# Patient Record
Sex: MALE | Race: WHITE | NOT HISPANIC OR LATINO | Employment: OTHER | URBAN - METROPOLITAN AREA
[De-identification: names, ages, dates, MRNs, and addresses within clinical notes are randomized per-mention and may not be internally consistent; named-entity substitution may affect disease eponyms.]

---

## 2019-05-29 ENCOUNTER — TELEPHONE (OUTPATIENT)
Dept: SCHEDULING | Facility: IMAGING CENTER | Age: 59
End: 2019-05-29

## 2019-08-27 ENCOUNTER — OFFICE VISIT (OUTPATIENT)
Dept: MEDICAL GROUP | Facility: MEDICAL CENTER | Age: 59
End: 2019-08-27
Payer: COMMERCIAL

## 2019-08-27 VITALS
RESPIRATION RATE: 17 BRPM | HEIGHT: 75 IN | TEMPERATURE: 98.3 F | WEIGHT: 200.2 LBS | DIASTOLIC BLOOD PRESSURE: 70 MMHG | BODY MASS INDEX: 24.89 KG/M2 | OXYGEN SATURATION: 97 % | SYSTOLIC BLOOD PRESSURE: 114 MMHG | HEART RATE: 55 BPM

## 2019-08-27 DIAGNOSIS — E03.8 OTHER SPECIFIED HYPOTHYROIDISM: ICD-10-CM

## 2019-08-27 DIAGNOSIS — Z86.19: ICD-10-CM

## 2019-08-27 DIAGNOSIS — Z86.73 HISTORY OF STROKE: ICD-10-CM

## 2019-08-27 DIAGNOSIS — I10 ESSENTIAL HYPERTENSION: ICD-10-CM

## 2019-08-27 DIAGNOSIS — B18.8: ICD-10-CM

## 2019-08-27 DIAGNOSIS — Z23 NEED FOR VACCINATION: ICD-10-CM

## 2019-08-27 DIAGNOSIS — Z00.00 PREVENTATIVE HEALTH CARE: ICD-10-CM

## 2019-08-27 DIAGNOSIS — Z12.5 ENCOUNTER FOR SCREENING FOR MALIGNANT NEOPLASM OF PROSTATE: ICD-10-CM

## 2019-08-27 DIAGNOSIS — Z87.891 HISTORY OF TOBACCO USE: ICD-10-CM

## 2019-08-27 DIAGNOSIS — Z11.59 ENCOUNTER FOR HEPATITIS C SCREENING TEST FOR LOW RISK PATIENT: ICD-10-CM

## 2019-08-27 PROCEDURE — 90471 IMMUNIZATION ADMIN: CPT | Performed by: INTERNAL MEDICINE

## 2019-08-27 PROCEDURE — 99204 OFFICE O/P NEW MOD 45 MIN: CPT | Mod: 25 | Performed by: INTERNAL MEDICINE

## 2019-08-27 PROCEDURE — 90715 TDAP VACCINE 7 YRS/> IM: CPT | Performed by: INTERNAL MEDICINE

## 2019-08-27 RX ORDER — LOSARTAN POTASSIUM 50 MG/1
50 TABLET ORAL DAILY
COMMUNITY
End: 2019-08-27 | Stop reason: SDUPTHER

## 2019-08-27 RX ORDER — LOSARTAN POTASSIUM 50 MG/1
50 TABLET ORAL DAILY
Qty: 90 TAB | Refills: 0 | Status: SHIPPED | OUTPATIENT
Start: 2019-08-27 | End: 2019-12-02 | Stop reason: SDUPTHER

## 2019-08-27 RX ORDER — LEVOTHYROXINE SODIUM 0.03 MG/1
25 TABLET ORAL
COMMUNITY
Start: 2019-06-03 | End: 2019-08-27 | Stop reason: SDUPTHER

## 2019-08-27 RX ORDER — LEVOTHYROXINE SODIUM 0.03 MG/1
25 TABLET ORAL
Qty: 90 TAB | Refills: 0 | Status: SHIPPED | OUTPATIENT
Start: 2019-08-27 | End: 2019-12-02 | Stop reason: SDUPTHER

## 2019-08-27 SDOH — HEALTH STABILITY: MENTAL HEALTH: HOW OFTEN DO YOU HAVE A DRINK CONTAINING ALCOHOL?: 2-3 TIMES A WEEK

## 2019-08-27 SDOH — HEALTH STABILITY: MENTAL HEALTH: HOW MANY STANDARD DRINKS CONTAINING ALCOHOL DO YOU HAVE ON A TYPICAL DAY?: 1 OR 2

## 2019-08-27 NOTE — ASSESSMENT & PLAN NOTE
Developed hepatitis E infection while vacationing in [facility] approximately 13 years ago.  He has had no residual complications.  Did receive the hepatitis A/B vaccine more than a decade ago.

## 2019-08-27 NOTE — ASSESSMENT & PLAN NOTE
History of hyperthyroidism in 2003, he did not require ablation or surgery. He was managed with medications until his thyroid burned out and now he is on levothyroxine.  Wife says he feels little more fatigued, they wish to recheck his blood work.  Otherwise no focal symptoms of hypothyroid.

## 2019-08-27 NOTE — ASSESSMENT & PLAN NOTE
Hx remote stroke, symptom of dizziness/disorientated, decreased motor of lower face and numbness in fingers.  Had brain imaging that showed stroke (no records available for distribution of cerebral infarct). No residual symptoms. W/u showed ?? small PFO, repair not thought to be needed.  Started on ASA 81 mg and blood pressure control. Neurologist did not recommend statin.  No recurrence.

## 2019-08-27 NOTE — ASSESSMENT & PLAN NOTE
Blood pressure is well controlled on losartan 50 mg daily.  He denies any cardiopulmonary symptoms.  Exercises regularly without any cardiac concerns.

## 2019-08-27 NOTE — ASSESSMENT & PLAN NOTE
Remote tobacco use, about a pack per month.  His father was a urologist and  of bladder cancer with no smoking history.  Patient is interested in urinalysis for screening.  No pulmonary symptoms.

## 2019-08-27 NOTE — PROGRESS NOTES
CC:  Diagnoses of Chronic hepatitis E virus infection (HCC), History of stroke, Other specified hypothyroidism, Essential hypertension, Encounter for hepatitis C screening test for low risk patient, Encounter for screening for malignant neoplasm of prostate, Preventative health care, History of tobacco use, Need for vaccination, and History of hepatitis E virus infection were pertinent to this visit.    HISTORY OF THE PRESENT ILLNESS: Patient is a 59 y.o. male. This pleasant patient is here today to establish care.  Presents with his wife Alma.      Other specified hypothyroidism  History of hyperthyroidism in , he did not require ablation or surgery. He was managed with medications until his thyroid burned out and now he is on levothyroxine.  Wife says he feels little more fatigued, they wish to recheck his blood work.  Otherwise no focal symptoms of hypothyroid.    History of tobacco use  Remote tobacco use, about a pack per month.  His father was a urologist and  of bladder cancer with no smoking history.  Patient is interested in urinalysis for screening.  No pulmonary symptoms.    History of stroke  Hx remote stroke, symptom of dizziness/disorientated, decreased motor of lower face and numbness in fingers.  Had brain imaging that showed stroke (no records available for distribution of cerebral infarct). No residual symptoms. W/u showed ?? small PFO, repair not thought to be needed.  Started on ASA 81 mg and blood pressure control. Neurologist did not recommend statin.  No recurrence.     Essential hypertension  Blood pressure is well controlled on losartan 50 mg daily.  He denies any cardiopulmonary symptoms.  Exercises regularly without any cardiac concerns.    History of hepatitis E virus infection  Developed hepatitis E infection while vacationing in [place] approximately 13 years ago.  He has had no residual complications.  Did receive the hepatitis A/B vaccine more than a decade ago.    Preventative  health care  Colonoscopy was performed around age 51 and he says he is due again in 10 years at age 61.    Allergies: Patient has no allergy information on record.    Current Outpatient Medications Ordered in Epic   Medication Sig Dispense Refill   • aspirin 81 MG tablet Take 81 mg by mouth every day.     • levothyroxine (SYNTHROID) 25 MCG Tab Take 1 Tab by mouth Every morning on an empty stomach. 90 Tab 0   • losartan (COZAAR) 50 MG Tab Take 1 Tab by mouth every day. 90 Tab 0     No current Epic-ordered facility-administered medications on file.        Past Medical History:   Diagnosis Date   • Hypertension    • Stroke (HCC)    • Thyroid disease        Past Surgical History:   Procedure Laterality Date   • OTHER      r rotator cuff repair x 2, one rotator repair on L; Left knee meniscus repair; plate & 7 screws L ankle   • TONSILLECTOMY         Social History     Tobacco Use   • Smoking status: Former Smoker     Years: 13.00     Types: Cigarettes     Last attempt to quit: 1996     Years since quittin.0   • Smokeless tobacco: Never Used   • Tobacco comment: Light social smoker; 1 pack/mo   Substance Use Topics   • Alcohol use: Yes     Alcohol/week: 1.2 - 1.8 oz     Types: 2 - 3 Cans of beer per week     Frequency: 2-3 times a week     Drinks per session: 1 or 2     Comment: Depends on the day for type    • Drug use: Not Currently       Social History     Social History Narrative   • Not on file       Family History   Problem Relation Age of Onset   • No Known Problems Mother    • Cancer Father         bladder cancer; no tob   • Other Brother         memory/neuro issues ? alzheimers/ PD   • Other Brother         memory/neuro issues ? alzheimers/ PD   • Other Brother         memory/neuro issues ? alzheimers/ PD   • Other Brother         memory/neuro issues ? alzheimers/ PD   • No Known Problems Maternal Grandmother    • No Known Problems Maternal Grandfather    • Other Paternal Grandmother         dementia  "  • Heart Disease Paternal Grandfather        ROS:     - Constitutional: Negative for fever, chills, unexpected weight change, night sweats    - Eyes:   Negative for blurry vision, eye pain, discharge    - ENT:  Negative for hearing changes, ear pain, ear discharge, rhinorrhea, sinus congestion, sore throat     - Respiratory: Negative for cough, sputum production, chest congestion, dyspnea, wheezing, and crackles.      - Cardiovascular: Negative for chest pain, palpitations, orthopnea, and bilateral lower extremity edema.     - Gastrointestinal: Negative for heartburn, nausea, vomiting, abdominal pain, hematochezia, melena, diarrhea, constipation, and greasy/foul-smelling stools.     - Genitourinary: Negative for dysuria, polyuria, hematuria, pyuria, urinary urgency, and urinary incontinence.     - Musculoskeletal: Negative for myalgias, back pain, and joint pain.     - Skin: Negative for rash, itching, cyanotic skin color change.     - Neurological: Negative for migraines, numbness, ataxia, tremors, vertigo    - Endo:Negative for polyuria, heat/cold intolerance, excessive thirst    - Hem/lymphatic: Negative for easy bruising, blood clots, lymphedema, swollen glands    -Allergic/immun: Negative for allergic rhinitis    - Psychiatric/Behavioral: Negative for depression, suicidal/homicidal ideation and memory loss.      Exam: /70 (BP Location: Right arm, Patient Position: Sitting, BP Cuff Size: Adult)   Pulse (!) 55   Temp 36.8 °C (98.3 °F) (Temporal)   Resp 17   Ht 1.895 m (6' 2.61\")   Wt 90.8 kg (200 lb 3.2 oz)   SpO2 97%  Body mass index is 25.29 kg/m².    General: Normal appearing. No distress.  EYES: Conjunctiva clear lids without ptosis, pupils equal  EARS: Normal shape and contour   NOSE, THROAT: nasal mucosa benign. oropharynx is without erythema, edema or exudates.   Neck: Supple without LAD. Thyroid is not enlarged.  Pulmonary: Clear to ausculation.  Normal effort. No rales or " wheezing.  Cardiovascular: Regular rate and rhythm without significant murmur.   Abdomen: Soft, nontender, nondistended. Normal bowel sounds.  Neurologic: Cranial nerves grossly nonfocal  Lymph: No cervical, supraclavicular nodes palpable  Skin: Warm and dry.  No obvious lesions.  Musculoskeletal: Normal gait. No extremity cyanosis, clubbing, or edema.  Psych: Normal mood and affect. Alert and oriented x3. Judgment and insight is normal.         Assessment/Plan  1.  History of remote hepatitis E infection without residual complications  Historical infection, no treatment needed.  - Comp Metabolic Panel; Future    2. History of stroke  Remote history of stroke, without recurrence.  Stable.  Blood pressure pressure controlled.  Continue aspirin 81 mg daily.  - Lipid Profile; Future    3. Other specified hypothyroidism  Chronic condition, patient presumes stable, will check labs.  - Lipid Profile; Future  - TSH; Future  - FREE THYROXINE; Future  - levothyroxine (SYNTHROID) 25 MCG Tab; Take 1 Tab by mouth Every morning on an empty stomach.  Dispense: 90 Tab; Refill: 0    4. Essential hypertension  Chronic, stable controlled medical condition.  - CBC WITH DIFFERENTIAL; Future  - Comp Metabolic Panel; Future  - Lipid Profile; Future  - losartan (COZAAR) 50 MG Tab; Take 1 Tab by mouth every day.  Dispense: 90 Tab; Refill: 0    5. Encounter for hepatitis C screening test for low risk patient  Preventive health guidelines to screen given birth cohort.    6. Encounter for screening for malignant neoplasm of prostate  Asymptomatic  -PSA; Future    7. Preventative health care  - HEP C VIRUS ANTIBODY; Future  - CBC WITH DIFFERENTIAL; Future  - Comp Metabolic Panel; Future  - Lipid Profile; Future  - TSH; Future  - VITAMIN D,25 HYDROXY; Future  - PROSTATE SPECIFIC AG SCREENING; Future  - URINALYSIS; Future    8. History of tobacco use  Remotely quit tobacco.  - URINALYSIS; Future    9. Need for vaccination  Last Tdap more than 10  years ago  - Tdap Vaccine =>6YO IM      Return to clinic 3 months        Please note that this dictation was created using voice recognition software. I have made every reasonable attempt to correct obvious errors, but I expect that there are errors of grammar and possibly content that I did not discover before finalizing the note.

## 2019-09-10 ENCOUNTER — HOSPITAL ENCOUNTER (OUTPATIENT)
Dept: LAB | Facility: MEDICAL CENTER | Age: 59
End: 2019-09-10
Attending: INTERNAL MEDICINE
Payer: COMMERCIAL

## 2019-09-10 DIAGNOSIS — I10 ESSENTIAL HYPERTENSION: ICD-10-CM

## 2019-09-10 DIAGNOSIS — Z86.73 HISTORY OF STROKE: ICD-10-CM

## 2019-09-10 DIAGNOSIS — E03.8 OTHER SPECIFIED HYPOTHYROIDISM: ICD-10-CM

## 2019-09-10 DIAGNOSIS — Z87.891 HISTORY OF TOBACCO USE: ICD-10-CM

## 2019-09-10 DIAGNOSIS — Z12.5 ENCOUNTER FOR SCREENING FOR MALIGNANT NEOPLASM OF PROSTATE: ICD-10-CM

## 2019-09-10 DIAGNOSIS — Z00.00 PREVENTATIVE HEALTH CARE: ICD-10-CM

## 2019-09-10 DIAGNOSIS — B18.8: ICD-10-CM

## 2019-09-10 LAB
25(OH)D3 SERPL-MCNC: 29 NG/ML (ref 30–100)
ALBUMIN SERPL BCP-MCNC: 4.1 G/DL (ref 3.2–4.9)
ALBUMIN/GLOB SERPL: 1.4 G/DL
ALP SERPL-CCNC: 57 U/L (ref 30–99)
ALT SERPL-CCNC: 20 U/L (ref 2–50)
ANION GAP SERPL CALC-SCNC: 9 MMOL/L (ref 0–11.9)
APPEARANCE UR: CLEAR
AST SERPL-CCNC: 29 U/L (ref 12–45)
BACTERIA #/AREA URNS HPF: NEGATIVE /HPF
BASOPHILS # BLD AUTO: 0.8 % (ref 0–1.8)
BASOPHILS # BLD: 0.04 K/UL (ref 0–0.12)
BILIRUB SERPL-MCNC: 0.8 MG/DL (ref 0.1–1.5)
BILIRUB UR QL STRIP.AUTO: NEGATIVE
BUN SERPL-MCNC: 24 MG/DL (ref 8–22)
CALCIUM SERPL-MCNC: 9.3 MG/DL (ref 8.5–10.5)
CHLORIDE SERPL-SCNC: 108 MMOL/L (ref 96–112)
CHOLEST SERPL-MCNC: 173 MG/DL (ref 100–199)
CO2 SERPL-SCNC: 19 MMOL/L (ref 20–33)
COLOR UR: YELLOW
CREAT SERPL-MCNC: 1.23 MG/DL (ref 0.5–1.4)
EOSINOPHIL # BLD AUTO: 0.05 K/UL (ref 0–0.51)
EOSINOPHIL NFR BLD: 1 % (ref 0–6.9)
EPI CELLS #/AREA URNS HPF: NEGATIVE /HPF
ERYTHROCYTE [DISTWIDTH] IN BLOOD BY AUTOMATED COUNT: 45.4 FL (ref 35.9–50)
FASTING STATUS PATIENT QL REPORTED: NORMAL
GLOBULIN SER CALC-MCNC: 2.9 G/DL (ref 1.9–3.5)
GLUCOSE SERPL-MCNC: 87 MG/DL (ref 65–99)
GLUCOSE UR STRIP.AUTO-MCNC: NEGATIVE MG/DL
HCT VFR BLD AUTO: 46.8 % (ref 42–52)
HCV AB SER QL: NEGATIVE
HDLC SERPL-MCNC: 61 MG/DL
HGB BLD-MCNC: 15.1 G/DL (ref 14–18)
HYALINE CASTS #/AREA URNS LPF: ABNORMAL /LPF
IMM GRANULOCYTES # BLD AUTO: 0.02 K/UL (ref 0–0.11)
IMM GRANULOCYTES NFR BLD AUTO: 0.4 % (ref 0–0.9)
KETONES UR STRIP.AUTO-MCNC: NEGATIVE MG/DL
LDLC SERPL CALC-MCNC: 103 MG/DL
LEUKOCYTE ESTERASE UR QL STRIP.AUTO: NEGATIVE
LYMPHOCYTES # BLD AUTO: 1.55 K/UL (ref 1–4.8)
LYMPHOCYTES NFR BLD: 30.4 % (ref 22–41)
MCH RBC QN AUTO: 30.9 PG (ref 27–33)
MCHC RBC AUTO-ENTMCNC: 32.3 G/DL (ref 33.7–35.3)
MCV RBC AUTO: 95.7 FL (ref 81.4–97.8)
MICRO URNS: ABNORMAL
MONOCYTES # BLD AUTO: 0.42 K/UL (ref 0–0.85)
MONOCYTES NFR BLD AUTO: 8.2 % (ref 0–13.4)
NEUTROPHILS # BLD AUTO: 3.02 K/UL (ref 1.82–7.42)
NEUTROPHILS NFR BLD: 59.2 % (ref 44–72)
NITRITE UR QL STRIP.AUTO: NEGATIVE
NRBC # BLD AUTO: 0 K/UL
NRBC BLD-RTO: 0 /100 WBC
PH UR STRIP.AUTO: 6 [PH] (ref 5–8)
PLATELET # BLD AUTO: 256 K/UL (ref 164–446)
PMV BLD AUTO: 9.9 FL (ref 9–12.9)
POTASSIUM SERPL-SCNC: 4.5 MMOL/L (ref 3.6–5.5)
PROT SERPL-MCNC: 7 G/DL (ref 6–8.2)
PROT UR QL STRIP: NEGATIVE MG/DL
PSA SERPL-MCNC: 0.92 NG/ML (ref 0–4)
RBC # BLD AUTO: 4.89 M/UL (ref 4.7–6.1)
RBC # URNS HPF: ABNORMAL /HPF
RBC UR QL AUTO: ABNORMAL
SODIUM SERPL-SCNC: 136 MMOL/L (ref 135–145)
SP GR UR STRIP.AUTO: 1.02
T4 FREE SERPL-MCNC: 1.06 NG/DL (ref 0.53–1.43)
TRIGL SERPL-MCNC: 43 MG/DL (ref 0–149)
TSH SERPL DL<=0.005 MIU/L-ACNC: 3.3 UIU/ML (ref 0.38–5.33)
UROBILINOGEN UR STRIP.AUTO-MCNC: 0.2 MG/DL
WBC # BLD AUTO: 5.1 K/UL (ref 4.8–10.8)
WBC #/AREA URNS HPF: ABNORMAL /HPF

## 2019-09-10 PROCEDURE — 82306 VITAMIN D 25 HYDROXY: CPT

## 2019-09-10 PROCEDURE — 85025 COMPLETE CBC W/AUTO DIFF WBC: CPT

## 2019-09-10 PROCEDURE — 84153 ASSAY OF PSA TOTAL: CPT

## 2019-09-10 PROCEDURE — 80053 COMPREHEN METABOLIC PANEL: CPT

## 2019-09-10 PROCEDURE — 84443 ASSAY THYROID STIM HORMONE: CPT

## 2019-09-10 PROCEDURE — 86803 HEPATITIS C AB TEST: CPT

## 2019-09-10 PROCEDURE — 84439 ASSAY OF FREE THYROXINE: CPT

## 2019-09-10 PROCEDURE — 36415 COLL VENOUS BLD VENIPUNCTURE: CPT

## 2019-09-10 PROCEDURE — 80061 LIPID PANEL: CPT

## 2019-09-10 PROCEDURE — 81001 URINALYSIS AUTO W/SCOPE: CPT

## 2019-09-12 ENCOUNTER — TELEPHONE (OUTPATIENT)
Dept: MEDICAL GROUP | Facility: MEDICAL CENTER | Age: 59
End: 2019-09-12

## 2019-09-13 ENCOUNTER — PATIENT MESSAGE (OUTPATIENT)
Dept: MEDICAL GROUP | Facility: MEDICAL CENTER | Age: 59
End: 2019-09-13

## 2019-09-13 NOTE — TELEPHONE ENCOUNTER
----- Message from Alison Granger M.D. sent at 9/11/2019  8:28 AM PDT -----  Please kindly instruct patient that we will need to discuss his labs at his next normally scheduled clinic appointment.  There was some microscopic blood in the urine that we will talk about.  Hepatitis C virus negative.  Vitamin D little low he should take cholecalciferol 1000 to 2000 units daily to replace.  Thyroid normal.  Prostate cancer screening test normal.  Lipids are reasonable.  Electrolytes are reasonable, likely the small changes just represent prolonged fasting.  Liver normal.  Blood counts are normal.

## 2019-09-13 NOTE — TELEPHONE ENCOUNTER
1. Caller Name: Thomas Huber  Call Back Number: [phone] (home) [phone] (work)        Patient approves a detailed voicemail message: N\A    Called and spoke to patient no further questions at this time. He is going to try the vitamin OTC and drink lots of milk. Patient also stated it is okay to wait until December to discuss the blood in urine unless you think otherwise. Please advise.

## 2019-11-26 ENCOUNTER — TELEPHONE (OUTPATIENT)
Dept: MEDICAL GROUP | Facility: MEDICAL CENTER | Age: 59
End: 2019-11-26

## 2019-11-27 NOTE — TELEPHONE ENCOUNTER
Was the patient seen in the last year in this department? Yes    Does patient have an active prescription for medications requested? No     Received Request Via: Pharmacy     Cialis 10 88ct?    Not showing Rx on file though...    [facility]  Med [facility]

## 2019-12-02 DIAGNOSIS — I10 ESSENTIAL HYPERTENSION: ICD-10-CM

## 2019-12-02 DIAGNOSIS — E03.8 OTHER SPECIFIED HYPOTHYROIDISM: ICD-10-CM

## 2019-12-02 RX ORDER — LOSARTAN POTASSIUM 50 MG/1
50 TABLET ORAL DAILY
Qty: 90 TAB | Refills: 0 | Status: SHIPPED
Start: 2019-12-02 | End: 2020-01-30 | Stop reason: SDUPTHER

## 2019-12-02 RX ORDER — LEVOTHYROXINE SODIUM 0.03 MG/1
25 TABLET ORAL
Qty: 90 TAB | Refills: 0 | Status: SHIPPED
Start: 2019-12-02 | End: 2020-01-30 | Stop reason: SDUPTHER

## 2019-12-02 NOTE — TELEPHONE ENCOUNTER
Was the patient seen in the last year in this department? Yes    Does patient have an active prescription for medications requested? No     Received Request Via: Patient     Pt is [place], needs refill over at [facility] provided.

## 2020-01-30 ENCOUNTER — OFFICE VISIT (OUTPATIENT)
Dept: MEDICAL GROUP | Facility: MEDICAL CENTER | Age: 60
End: 2020-01-30
Payer: COMMERCIAL

## 2020-01-30 VITALS
OXYGEN SATURATION: 99 % | BODY MASS INDEX: 25.99 KG/M2 | HEIGHT: 75 IN | TEMPERATURE: 98.2 F | HEART RATE: 63 BPM | SYSTOLIC BLOOD PRESSURE: 122 MMHG | DIASTOLIC BLOOD PRESSURE: 80 MMHG | WEIGHT: 209 LBS

## 2020-01-30 DIAGNOSIS — Z12.5 ENCOUNTER FOR SCREENING FOR MALIGNANT NEOPLASM OF PROSTATE: ICD-10-CM

## 2020-01-30 DIAGNOSIS — Z00.00 PREVENTATIVE HEALTH CARE: ICD-10-CM

## 2020-01-30 DIAGNOSIS — I10 ESSENTIAL HYPERTENSION: ICD-10-CM

## 2020-01-30 DIAGNOSIS — Z23 NEED FOR VACCINATION: ICD-10-CM

## 2020-01-30 DIAGNOSIS — R31.21 ASYMPTOMATIC MICROSCOPIC HEMATURIA: ICD-10-CM

## 2020-01-30 DIAGNOSIS — E78.00 ELEVATED LDL CHOLESTEROL LEVEL: ICD-10-CM

## 2020-01-30 DIAGNOSIS — E03.8 OTHER SPECIFIED HYPOTHYROIDISM: ICD-10-CM

## 2020-01-30 PROCEDURE — 90686 IIV4 VACC NO PRSV 0.5 ML IM: CPT | Performed by: INTERNAL MEDICINE

## 2020-01-30 PROCEDURE — 90471 IMMUNIZATION ADMIN: CPT | Performed by: INTERNAL MEDICINE

## 2020-01-30 PROCEDURE — 99396 PREV VISIT EST AGE 40-64: CPT | Mod: 25 | Performed by: INTERNAL MEDICINE

## 2020-01-30 RX ORDER — LOSARTAN POTASSIUM 50 MG/1
50 TABLET ORAL DAILY
Qty: 90 TAB | Refills: 0 | Status: SHIPPED | OUTPATIENT
Start: 2020-01-30 | End: 2020-04-21

## 2020-01-30 RX ORDER — TADALAFIL 10 MG/1
10 TABLET ORAL PRN
Qty: 10 TAB | Refills: 11 | Status: SHIPPED
Start: 2020-01-30 | End: 2021-07-06 | Stop reason: SDUPTHER

## 2020-01-30 RX ORDER — LEVOTHYROXINE SODIUM 0.03 MG/1
25 TABLET ORAL
Qty: 90 TAB | Refills: 0 | Status: SHIPPED | OUTPATIENT
Start: 2020-01-30 | End: 2020-04-21

## 2020-01-30 ASSESSMENT — PATIENT HEALTH QUESTIONNAIRE - PHQ9: CLINICAL INTERPRETATION OF PHQ2 SCORE: 0

## 2020-01-30 NOTE — PROGRESS NOTES
CC:  Diagnoses of Asymptomatic microscopic hematuria, Need for vaccination, Essential hypertension, Other specified hypothyroidism, Elevated LDL cholesterol level, Preventative health care, and Encounter for screening for malignant neoplasm of prostate were pertinent to this visit.    HISTORY OF THE PRESENT ILLNESS: Patient is a 59 y.o. male. This pleasant patient is here today to f/u. Here w/wife.    Messi feels well no new issues.    He does need refill of his losartan, blood pressure well controlled, electrolytes normal, no cardiopulmonary symptoms.    Refill of levothyroxine, he feels euthyroid, labs have been reasonable.    Labs from 9/10/2019 reviewed with patient since her last appointment, TSH 3.300, T4 1.06.  PSA normal.  Urinalysis did show some microscopic hematuria which is very minimal 0-2 red blood cells per high-power field, he does have a history of tobacco dependence and travel in [place].  He is agreeable to see urology to determine if he needs further evaluation of this.  Otherwise vitamin D was a level 29 and he has since started cholecalciferol.  CMP and CBC reasonable, and lipid panel most in acute for LDL of 103.  He will work on dietary changes and regular exercise.    Also says he requires refill of tadalafil that he has taken for many years.  Takes it for erectile dysfunction and also he indicates for nocturia.  He is advised that nocturia treatment would be to limit caffeine, alcohol and any fluid intake 2 hours for bed.  He will also discuss this further with his urologist.  He knows not to mix tadalafil within 4 hours of his blood pressure medication, and that the medication can be fatal combination with cardiac drugs such as nitrates and he expressed understanding.  He has no chest pain at rest or exertion or suggestion of cardiopulmonary disease.    Allergies: Patient has no known allergies.    Current Outpatient Medications Ordered in Epic   Medication Sig Dispense Refill   •  losartan (COZAAR) 50 MG Tab Take 1 Tab by mouth every day. 90 Tab 0   • levothyroxine (SYNTHROID) 25 MCG Tab Take 1 Tab by mouth Every morning on an empty stomach. 90 Tab 0   • tadalafil (CIALIS) 10 MG tablet Take 1 Tab by mouth as needed for Erectile Dysfunction. 10 Tab 11   • aspirin 81 MG tablet Take 81 mg by mouth every day.       No current [facility]-ordered facility-administered medications on file.        Past Medical History:   Diagnosis Date   • Hypertension    • Stroke (HCC)    • Thyroid disease        Past Surgical History:   Procedure Laterality Date   • OTHER      r rotator cuff repair x 2, one rotator repair on L; Left knee meniscus repair; plate & 7 screws L ankle   • TONSILLECTOMY         Social History     Tobacco Use   • Smoking status: Former Smoker     Years: 13.00     Types: Cigarettes     Last attempt to quit: 1996     Years since quittin.4   • Smokeless tobacco: Never Used   • Tobacco comment: Light social smoker; 1 pack/mo   Substance Use Topics   • Alcohol use: Yes     Alcohol/week: 1.2 - 1.8 oz     Types: 2 - 3 Cans of beer per week     Frequency: 2-3 times a week     Drinks per session: 1 or 2     Comment: Depends on the day for type    • Drug use: Not Currently       Social History     Patient does not qualify to have social determinant information on file (likely too young).   Social History Narrative   • Not on file       Family History   Problem Relation Age of Onset   • No Known Problems Mother    • Cancer Father         bladder cancer; no tob   • Other Brother         memory/neuro issues ? alzheimers/ PD   • Other Brother         memory/neuro issues ? alzheimers/ PD   • Other Brother         memory/neuro issues ? alzheimers/ PD   • Other Brother         memory/neuro issues ? alzheimers/ PD   • No Known Problems Maternal Grandmother    • No Known Problems Maternal Grandfather    • Other Paternal Grandmother         dementia   • Heart Disease Paternal Grandfather        ROS:     -  "Constitutional: Negative for fever, chills, unexpected weight change, night sweats    - Eyes:   Negative for blurry vision, eye pain, discharge    - ENT:  Negative for hearing changes, ear pain, ear discharge, rhinorrhea, sinus congestion, sore throat     - Respiratory: Negative for cough, sputum production, chest congestion, dyspnea, wheezing, and crackles.      - Cardiovascular: Negative for chest pain, palpitations, orthopnea, and bilateral lower extremity edema.     - Gastrointestinal: Negative for heartburn, nausea, vomiting, abdominal pain, hematochezia, melena, diarrhea, constipation, and greasy/foul-smelling stools.     - Genitourinary: Negative for dysuria, polyuria, hematuria, pyuria, urinary urgency, and urinary incontinence.     - Musculoskeletal: Negative for myalgias, back pain, and joint pain.     - Skin: Negative for rash, itching, cyanotic skin color change.     - Neurological: Negative for migraines, numbness, ataxia, tremors, vertigo    - Endo:Negative for polyuria, heat/cold intolerance, excessive thirst    - Hem/lymphatic: Negative for easy bruising, blood clots, lymphedema, swollen glands    -Allergic/immun: Negative for allergic rhinitis    - Psychiatric/Behavioral: Negative for depression, suicidal/homicidal ideation and memory loss.      Exam: /80 (BP Location: Right arm, Patient Position: Sitting, BP Cuff Size: Adult)   Pulse 63   Temp 36.8 °C (98.2 °F) (Temporal)   Ht 1.905 m (6' 3\")   Wt 94.8 kg (208 lb 15.9 oz)   SpO2 99%  Body mass index is 26.12 kg/m².    General: Normal appearing. No distress.  EYES: Conjunctiva clear lids without ptosis, pupils equal  EARS: Normal shape and contour   NOSE, THROAT: nasal mucosa benign. oropharynx is without erythema, edema or exudates.   Neck: Supple without LAD. Thyroid is not enlarged.  Pulmonary: Clear to ausculation.  Normal effort. No rales or wheezing.  Cardiovascular: Regular rate and rhythm without significant murmur.   Abdomen: " Soft, nontender, nondistended. Normal bowel sounds.  Neurologic: Cranial nerves grossly nonfocal  Lymph: No cervical, supraclavicular nodes palpable  Skin: Warm and dry.  No obvious lesions.  Musculoskeletal: Normal gait. No extremity cyanosis, clubbing, or edema.  Psych: Normal mood and affect. Alert and oriented x3. Judgment and insight is normal.        Assessment/Plan  1. Asymptomatic microscopic hematuria  Although he has minimal red blood cells per high-power field he does have risk factors such as tobacco, travel in [place] (risk for schistosomiasis ) where he had already become infected hepatitis E remotely (infected seafood).  His father did have bladder cancer which is likely not a genetic issue.  Ultimately patient is willing to see urology to further evaluate if he would require additional work-up to include cystoscopy.  Patient is aware that microscopic asymptomatic hematuria can be a sign of inflammation, there is no signs of UTI urinalysis, malignancy, etc.  - REFERRAL TO UROLOGY    2. Need for vaccination  - Influenza Vaccine Quad Injection (PF)    3. Essential hypertension  Stable, chronic well-controlled continue losartan.  - losartan (COZAAR) 50 MG Tab; Take 1 Tab by mouth every day.  Dispense: 90 Tab; Refill: 0  - CBC WITHOUT DIFFERENTIAL; Future  - Comp Metabolic Panel; Future    4. Other specified hypothyroidism  Stable, chronic well-controlled continue levothyroxine.  - levothyroxine (SYNTHROID) 25 MCG Tab; Take 1 Tab by mouth Every morning on an empty stomach.  Dispense: 90 Tab; Refill: 0  - TSH WITH REFLEX TO FT4; Future  - CBC WITHOUT DIFFERENTIAL; Future  - Comp Metabolic Panel; Future    5. Elevated LDL cholesterol level  Stable, chronic, reasonable control, patient will work on diet exercise and reassess which is reasonable, may ultimately benefit from statin.  Patient prefers non-statin treatment at this time.  With his history of prior stroke, records are not available, patient  indicates he did meet with a neurologist at that time and only aspirin but not statin was recommended.  - Lipid Profile; Future    6. Preventative health care  Patient desires to establish local dermatologist for preventive health.  He is not sure about his prior dermatologic treatments but it sounds like he has had treatment for actinic keratosis.  - Lipid Profile; Future  - PROSTATE SPECIFIC AG SCREENING; Future  - TSH WITH REFLEX TO FT4; Future  - CBC WITHOUT DIFFERENTIAL; Future  - Comp Metabolic Panel; Future    7. Encounter for screening for malignant neoplasm of prostate  Annual screening again in September.  - PROSTATE SPECIFIC AG SCREENING; Future    8.  Nocturia  Advised to limit caffeine, alcohol, no fluid intake 2 hours for bed.  May continue tadalafil for erectile dysfunction, follow-up urology as above.    Return to clinic annually or sooner if needed      Please note that this dictation was created using voice recognition software. I have made every reasonable attempt to correct obvious errors, but I expect that there are errors of grammar and possibly content that I did not discover before finalizing the note.

## 2020-04-20 DIAGNOSIS — E03.8 OTHER SPECIFIED HYPOTHYROIDISM: ICD-10-CM

## 2020-04-20 DIAGNOSIS — I10 ESSENTIAL HYPERTENSION: ICD-10-CM

## 2020-04-21 DIAGNOSIS — I10 ESSENTIAL HYPERTENSION: ICD-10-CM

## 2020-04-21 DIAGNOSIS — E03.8 OTHER SPECIFIED HYPOTHYROIDISM: ICD-10-CM

## 2020-04-21 RX ORDER — LEVOTHYROXINE SODIUM 0.03 MG/1
25 TABLET ORAL
Qty: 90 TAB | Refills: 0 | Status: SHIPPED | OUTPATIENT
Start: 2020-04-21 | End: 2022-03-03

## 2020-04-21 RX ORDER — LOSARTAN POTASSIUM 50 MG/1
50 TABLET ORAL DAILY
Qty: 90 TAB | Refills: 0 | Status: SHIPPED
Start: 2020-04-21 | End: 2021-01-27

## 2020-04-21 RX ORDER — LEVOTHYROXINE SODIUM 0.03 MG/1
TABLET ORAL
Qty: 90 TAB | Refills: 0 | Status: SHIPPED | OUTPATIENT
Start: 2020-04-21 | End: 2020-12-02 | Stop reason: SDUPTHER

## 2020-04-21 RX ORDER — LOSARTAN POTASSIUM 50 MG/1
TABLET ORAL
Qty: 90 TAB | Refills: 0 | Status: SHIPPED | OUTPATIENT
Start: 2020-04-21 | End: 2020-12-02 | Stop reason: SDUPTHER

## 2020-06-04 ENCOUNTER — OFFICE VISIT (OUTPATIENT)
Dept: URGENT CARE | Facility: CLINIC | Age: 60
End: 2020-06-04
Payer: COMMERCIAL

## 2020-06-04 VITALS
DIASTOLIC BLOOD PRESSURE: 70 MMHG | SYSTOLIC BLOOD PRESSURE: 112 MMHG | HEIGHT: 75 IN | OXYGEN SATURATION: 99 % | TEMPERATURE: 99.2 F | WEIGHT: 200 LBS | RESPIRATION RATE: 12 BRPM | HEART RATE: 60 BPM | BODY MASS INDEX: 24.87 KG/M2

## 2020-06-04 DIAGNOSIS — H65.03 NON-RECURRENT ACUTE SEROUS OTITIS MEDIA OF BOTH EARS: ICD-10-CM

## 2020-06-04 PROCEDURE — 99214 OFFICE O/P EST MOD 30 MIN: CPT | Performed by: PHYSICIAN ASSISTANT

## 2020-06-04 RX ORDER — PREDNISONE 20 MG/1
40 TABLET ORAL DAILY
Qty: 10 TAB | Refills: 0 | Status: SHIPPED | OUTPATIENT
Start: 2020-06-04 | End: 2020-06-09

## 2020-06-04 RX ORDER — AMOXICILLIN AND CLAVULANATE POTASSIUM 500; 125 MG/1; MG/1
1 TABLET, FILM COATED ORAL 3 TIMES DAILY
COMMUNITY
Start: 2020-06-02 | End: 2021-01-27

## 2020-06-04 ASSESSMENT — ENCOUNTER SYMPTOMS
SORE THROAT: 0
NECK PAIN: 0
VOMITING: 0
ABDOMINAL PAIN: 0
COUGH: 0
HEADACHES: 0
RHINORRHEA: 0
DIARRHEA: 0

## 2020-06-04 ASSESSMENT — FIBROSIS 4 INDEX: FIB4 SCORE: 1.49

## 2020-06-04 NOTE — PROGRESS NOTES
Subjective:   Thomas Huber is a 60 y.o. male who presents for Ear Fullness (bilateral, x5 days )        Patient presents with 5 days of bilateral ear fullness and ear pressure.  Right worse than left.  States that he he has had symptoms consistent with a sinus infection for the past several weeks.  He saw tele-doc on Tuesday and was started on Augmentin.  He has been on this medication for approximately 2 days.  He has tried eardrops with no improvement.    Otalgia    There is pain in both ears. This is a new problem. The current episode started in the past 7 days. The problem occurs constantly. The problem has been unchanged. There has been no fever. The pain is mild. Associated symptoms include hearing loss. Pertinent negatives include no abdominal pain, coughing, diarrhea, ear discharge, headaches, neck pain, rash, rhinorrhea, sore throat or vomiting. He has tried antibiotics and ear drops for the symptoms. The treatment provided no relief. His past medical history is significant for a tympanostomy tube (as a child). There is no history of a chronic ear infection or hearing loss.     Review of Systems   HENT: Positive for ear pain and hearing loss. Negative for ear discharge, rhinorrhea and sore throat.    Respiratory: Negative for cough.    Gastrointestinal: Negative for abdominal pain, diarrhea and vomiting.   Musculoskeletal: Negative for neck pain.   Skin: Negative for rash.   Neurological: Negative for headaches.       PMH:  has a past medical history of Hypertension, Stroke (HCC), and Thyroid disease.  MEDS:   Current Outpatient Medications:   •  amoxicillin-clavulanate (AUGMENTIN) 500-125 MG Tab, Take 1 Tab by mouth 3 times a day., Disp: , Rfl:   •  predniSONE (DELTASONE) 20 MG Tab, Take 2 Tabs by mouth every day for 5 days., Disp: 10 Tab, Rfl: 0  •  losartan (COZAAR) 50 MG Tab, Take 1 Tab by mouth every day., Disp: 90 Tab, Rfl: 0  •  levothyroxine (SYNTHROID) 25 MCG Tab, Take 1 Tab by mouth Every  "morning on an empty stomach., Disp: 90 Tab, Rfl: 0  •  tadalafil (CIALIS) 10 MG tablet, Take 1 Tab by mouth as needed for Erectile Dysfunction., Disp: 10 Tab, Rfl: 11  •  aspirin 81 MG tablet, Take 81 mg by mouth every day., Disp: , Rfl:   •  losartan (COZAAR) 50 MG Tab, TAKE ONE TABLET BY MOUTH ONE TIME DAILY, Disp: 90 Tab, Rfl: 0  •  levothyroxine (SYNTHROID) 25 MCG Tab, TAKE 1 TABLET BY MOUTH EVERY MORNING ON AN EMPTY STOMACH, Disp: 90 Tab, Rfl: 0  ALLERGIES: No Known Allergies  SURGHX:   Past Surgical History:   Procedure Laterality Date   • OTHER      r rotator cuff repair x 2, one rotator repair on L; Left knee meniscus repair; plate & 7 screws L ankle   • TONSILLECTOMY       SOCHX:  reports that he quit smoking about 23 years ago. His smoking use included cigarettes. He smoked 0.00 packs per day for 13.00 years. He has never used smokeless tobacco. He reports current alcohol use of about 1.2 - 1.8 oz of alcohol per week. He reports previous drug use.  FH: Family history was reviewed, no pertinent findings to report   Objective:   /70   Pulse 60   Temp 37.3 °C (99.2 °F) (Temporal)   Resp 12   Ht 1.905 m (6' 3\")   Wt 90.7 kg (200 lb)   SpO2 99%   BMI 25.00 kg/m²   Physical Exam  Vitals signs reviewed.   Constitutional:       General: He is not in acute distress.     Appearance: Normal appearance. He is well-developed. He is not toxic-appearing.   HENT:      Head: Normocephalic and atraumatic.      Right Ear: Ear canal and external ear normal. A middle ear effusion is present. There is no impacted cerumen. No mastoid tenderness. Tympanic membrane is not erythematous. Tympanic membrane has decreased mobility (no movement with valsalva).      Left Ear: Ear canal and external ear normal. A middle ear effusion is present. There is no impacted cerumen. No mastoid tenderness. Tympanic membrane is not erythematous. Tympanic membrane has normal mobility.      Ears:      Comments: TMs are whitish and dull " with loss of light reflex bilaterally.  Fluid visible behind ears bilaterally.     Nose: Nose normal.   Eyes:      General: Gaze aligned appropriately.   Neck:      Musculoskeletal: Neck supple.   Cardiovascular:      Rate and Rhythm: Normal rate and regular rhythm.   Pulmonary:      Effort: Pulmonary effort is normal. No respiratory distress.      Breath sounds: No stridor.   Skin:     General: Skin is warm and dry.      Capillary Refill: Capillary refill takes less than 2 seconds.   Neurological:      Mental Status: He is alert and oriented to person, place, and time.      Comments: CN2-12 grossly intact   Psychiatric:         Speech: Speech normal.         Behavior: Behavior normal.           Assessment/Plan:   1. Non-recurrent acute serous otitis media of both ears  - predniSONE (DELTASONE) 20 MG Tab; Take 2 Tabs by mouth every day for 5 days.  Dispense: 10 Tab; Refill: 0    Other orders  - amoxicillin-clavulanate (AUGMENTIN) 500-125 MG Tab; Take 1 Tab by mouth 3 times a day.    Continue antibiotic.  Patient started on 5-day course prednisone.  I did recommend the patient begin using Otovent to help eustachian tube drainage.  He is resistant to this idea, however.  Risks, benefits, side effects of oral steroids discussed with patient.  He verbalized good understanding of these.  Indications for immediate reevaluation in urgent care discussed.    Differential diagnosis, natural history, supportive care, and indications for immediate follow-up discussed.

## 2020-06-04 NOTE — PATIENT INSTRUCTIONS
1. Continue Augmentin as prescribed.  2. Start Prednisone.  3. Consider Otovent nasal balloon.  4. If symptoms worsen or fail to fully resolve please f/u with PCP for reevaluation.

## 2020-06-06 DIAGNOSIS — I10 ESSENTIAL HYPERTENSION: ICD-10-CM

## 2020-06-08 RX ORDER — LOSARTAN POTASSIUM 50 MG/1
TABLET ORAL
Qty: 90 TAB | Refills: 0 | Status: SHIPPED
Start: 2020-06-08 | End: 2021-01-27

## 2020-06-09 NOTE — TELEPHONE ENCOUNTER
From: Thomas Huber  To: Alison Granger M.D.  Sent: 9/13/2019 11:08 AM PDT  Subject: Test Result Question    Recommended daily dosage of D-3 vitamin in light of blood test result? THANK YOU!

## 2020-06-09 NOTE — PATIENT COMMUNICATION
"Phone Number Called: [phone] (home) [phone] (work)      Call outcome: Spoke to patient regarding message below.    Message: Sinus symptoms  x2wks ago, \"plugged ears\" states medication prescribed at [facility] urgent care didn't work. Would like to know what he should do.        "

## 2020-06-23 ENCOUNTER — OFFICE VISIT (OUTPATIENT)
Dept: MEDICAL GROUP | Facility: MEDICAL CENTER | Age: 60
End: 2020-06-23
Payer: COMMERCIAL

## 2020-06-23 VITALS
SYSTOLIC BLOOD PRESSURE: 166 MMHG | RESPIRATION RATE: 16 BRPM | WEIGHT: 210.54 LBS | BODY MASS INDEX: 26.18 KG/M2 | TEMPERATURE: 98.9 F | HEIGHT: 75 IN | DIASTOLIC BLOOD PRESSURE: 90 MMHG | HEART RATE: 60 BPM

## 2020-06-23 DIAGNOSIS — J01.10 SUBACUTE FRONTAL SINUSITIS: ICD-10-CM

## 2020-06-23 PROCEDURE — 99214 OFFICE O/P EST MOD 30 MIN: CPT | Performed by: INTERNAL MEDICINE

## 2020-06-23 RX ORDER — FLUTICASONE PROPIONATE 50 MCG
1 SPRAY, SUSPENSION (ML) NASAL DAILY
Qty: 16 G | Refills: 3 | Status: SHIPPED
Start: 2020-06-23 | End: 2021-01-27

## 2020-06-23 RX ORDER — DOXYCYCLINE HYCLATE 100 MG
100 TABLET ORAL 2 TIMES DAILY
Qty: 14 TAB | Refills: 0 | Status: SHIPPED | OUTPATIENT
Start: 2020-06-23 | End: 2020-06-30

## 2020-06-23 ASSESSMENT — FIBROSIS 4 INDEX: FIB4 SCORE: 1.49

## 2020-06-23 NOTE — PROGRESS NOTES
CC:  The encounter diagnosis was Subacute frontal sinusitis.    HISTORY OF THE PRESENT ILLNESS: Patient is a 60 y.o. male. This pleasant patient is here today for concern over sinuses.    Started roughly 1 month ago, approx end of May with sensation of right nasal blockage.  Worse at higher elevation.  He subsequently developed plugged sensation right more than left ear, similar to feeling like his ear is underwater with some associated minimal hearing decrease and feeling off equilibrium, malaise.  He subsequently developed some pain in sinus region of his cheeks and forehead.  Went to urgent care 6/4/2020 received prednisone and Augmentin, did not seem to help too much.  Later did another course through telemedicine and was prescribed doxycycline and this seemed to be most effective.  After the doxycycline his sinuses seemed to loosen up, the maxillary discomfort resolved, the sinus discomfort of the forehead significantly minimized.  Continues to have no fever, chills, night sweats, diplopia or vision change.  After the doxycycline he had some drainage and clearing, now no drainage, purulence or postnasal drip.  No cough or pulmonary symptoms.  Says he is breathing fine after most recent antibiotics.  The main residual symptom that is problematic is sensation that his ear is still blocked.  He is not using any nasal steroid sprays.  Recently started Zyrtec which seemed to help some.  Continues to do conservative methods such as humidifiers, irrigation of sinuses, nasal passage cleaning during showers, etc. with benefit.  Has history of sinusitis at least once per year but this is his worst episode, he thinks may be due to allergies.    Since last appointment he also saw the urologist for his microscopic asymptomatic hematuria and states he has further testing this July.  No new symptoms that correlate with his history of microhematuria.    His blood pressure is elevated in clinic, he says he thinks is just due to  coming in here today.  He says he checks it consistently at home and is well controlled 120/80.    Allergies: Patient has no known allergies.    Current Outpatient Medications Ordered in Epic   Medication Sig Dispense Refill   • fluticasone (FLONASE) 50 MCG/ACT nasal spray Spray 1 Spray in nose every day. 16 g 3   • doxycycline (VIBRAMYCIN) 100 MG Tab Take 1 Tab by mouth 2 times a day for 7 days. 14 Tab 0   • amoxicillin-clavulanate (AUGMENTIN) 500-125 MG Tab Take 1 Tab by mouth 3 times a day.     • losartan (COZAAR) 50 MG Tab TAKE ONE TABLET BY MOUTH ONE TIME DAILY 90 Tab 0   • levothyroxine (SYNTHROID) 25 MCG Tab TAKE 1 TABLET BY MOUTH EVERY MORNING ON AN EMPTY STOMACH 90 Tab 0   • tadalafil (CIALIS) 10 MG tablet Take 1 Tab by mouth as needed for Erectile Dysfunction. 10 Tab 11   • aspirin 81 MG tablet Take 81 mg by mouth every day.     • losartan (COZAAR) 50 MG Tab TAKE ONE TABLET BY MOUTH ONE TIME DAILY  (Patient not taking: Reported on 2020) 90 Tab 0   • losartan (COZAAR) 50 MG Tab Take 1 Tab by mouth every day. (Patient not taking: Reported on 2020) 90 Tab 0   • levothyroxine (SYNTHROID) 25 MCG Tab Take 1 Tab by mouth Every morning on an empty stomach. 90 Tab 0     No current Epic-ordered facility-administered medications on file.        Past Medical History:   Diagnosis Date   • Hypertension    • Stroke (HCC)    • Thyroid disease        Past Surgical History:   Procedure Laterality Date   • OTHER      r rotator cuff repair x 2, one rotator repair on L; Left knee meniscus repair; plate & 7 screws L ankle   • TONSILLECTOMY         Social History     Tobacco Use   • Smoking status: Former Smoker     Packs/day: 0.00     Years: 13.00     Pack years: 0.00     Types: Cigarettes     Last attempt to quit: 1996     Years since quittin.8   • Smokeless tobacco: Never Used   • Tobacco comment: Light social smoker; 1 pack/mo   Substance Use Topics   • Alcohol use: Yes     Alcohol/week: 1.2 - 1.8 oz      Types: 2 - 3 Cans of beer per week     Frequency: 2-3 times a week     Drinks per session: 1 or 2     Comment: Depends on the day for type    • Drug use: Not Currently       Social History     Social History Narrative   • Not on file       Family History   Problem Relation Age of Onset   • No Known Problems Mother    • Cancer Father         bladder cancer; no tob   • Other Brother         memory/neuro issues ? alzheimers/ PD   • Other Brother         memory/neuro issues ? alzheimers/ PD   • Other Brother         memory/neuro issues ? alzheimers/ PD   • Other Brother         memory/neuro issues ? alzheimers/ PD   • No Known Problems Maternal Grandmother    • No Known Problems Maternal Grandfather    • Other Paternal Grandmother         dementia   • Heart Disease Paternal Grandfather        ROS:     - Constitutional: Negative for fever, chills, unexpected weight change, night sweats    - Eyes:   Negative for blurry vision, eye pain, discharge    - ENT:  Negative for sore throat     - Respiratory: Negative for cough, sputum production, chest congestion, dyspnea, wheezing, and crackles.      - Cardiovascular: Negative for chest pain, palpitations, orthopnea, and bilateral lower extremity edema.     - Gastrointestinal: Negative for heartburn, nausea, vomiting, abdominal pain, hematochezia, melena, diarrhea, constipation, and greasy/foul-smelling stools.     - Genitourinary: Negative for dysuria    - Musculoskeletal: Negative for myalgias, back pain, and joint pain.     - Skin: Negative for rash, itching, cyanotic skin color change.     - Neurological: Negative for migraines, numbness, ataxia, tremors, vertigo    - Endo:Negative for polyuria, heat/cold intolerance, excessive thirst    - Hem/lymphatic: Negative for easy bruising, blood clots, lymphedema, swollen glands    -Allergic/immun: Negative for allergic rhinitis    - Psychiatric/Behavioral: Negative for depression, suicidal/homicidal ideation and memory loss.   "    Exam: BP (!) 166/90 (BP Location: Right arm, Patient Position: Sitting, BP Cuff Size: Adult)   Pulse 60   Temp 37.2 °C (98.9 °F) (Temporal)   Resp 16   Ht 1.905 m (6' 3\")   Wt 95.5 kg (210 lb 8.6 oz)  Body mass index is 26.32 kg/m².    General: Normal appearing. No distress.  EYES: Conjunctiva clear lids without ptosis, pupils equal.  Nystagmus  EARS: Normal shape and contour.  The ear canals and tympanic membrane are clear bilaterally no obstructive wax, erythema, bulging/retraction or drainage.  There is light reflex present bilaterally as well.  Palpating his sinuses there is no tenderness, no skin redness or rash, no focal swelling.  NOSE, THROAT: nasal mucosa benign. oropharynx is without erythema, edema or exudates.   Neck: Supple without LAD. Thyroid is not enlarged.  Pulmonary: Clear to ausculation.  Normal effort. No rales or wheezing.  Cardiovascular: Regular rate and rhythm without significant murmur.   Abdomen: Soft, nontender, nondistended. Normal bowel sounds.  Neurologic: Cranial nerves grossly nonfocal  Lymph: No cervical, supraclavicular nodes palpable  Skin: Warm and dry.  No obvious lesions.  Musculoskeletal: Normal gait. No extremity cyanosis, clubbing, or edema.  Psych: Normal mood and affect. Alert and oriented x3. Judgment and insight is normal.        Assessment/Plan  1. Subacute frontal sinusitis  New issue. Discussed with patient I think that overall with improvement in his symptoms, resolution of the nasal congestion, nearly resolved sinus discomfort, etc. with now primarily some residual sensation of plugged ear, that we should manage his symptoms conservatively.  Recommend to start using Flonase daily, continue Zyrtec or similar drug and other conservative treatments.  However, if he does not continue to improve, or if he has worsening symptoms such as development of sinus pain and pressure again, would be reasonable to restart antibiotic at that time.  Additionally patient will " let me know if his symptoms do not totally resolve over the next couple weeks and continue to get better.  Suspect tincture of time will be most helpful.  He is planning to go to [place] soon, advised time at lower elevation with salt water in the air may be very helpful for his sinuses to expedite healing.  If symptoms do not resolve, potential for CT sinus, ENT referral, etc although at this time not thought necessary and patient agrees.  - fluticasone (FLONASE) 50 MCG/ACT nasal spray; Spray 1 Spray in nose every day.  Dispense: 16 g; Refill: 3  - doxycycline (VIBRAMYCIN) 100 MG Tab; Take 1 Tab by mouth 2 times a day for 7 days.  Dispense: 14 Tab; Refill: 0      rtc prn        Please note that this dictation was created using voice recognition software. I have made every reasonable attempt to correct obvious errors, but I expect that there are errors of grammar and possibly content that I did not discover before finalizing the note.

## 2020-07-15 DIAGNOSIS — J32.9 OTHER SINUSITIS, UNSPECIFIED CHRONICITY: ICD-10-CM

## 2020-07-15 NOTE — PROGRESS NOTES
1. Caller Name: Thomas Huber                          Call Back Number: [phone]        How would the patient prefer to be contacted with a response: Phone call OK to leave a detailed message    2. SPECIFIC Action To Be Taken: Referral pending, please sign.    3. Diagnosis/Clinical Reason for Request: ENT SINISITUS    4. Specialty & Provider Name/Lab/Imaging Location: ENT    5. Is appointment scheduled for requested order/referral: no    Patient was informed they will receive a return phone call from the office ONLY if there are any questions before processing their request. Advised to call back if they haven't received a call from the referral department in 5 days.

## 2020-08-26 ENCOUNTER — HOSPITAL ENCOUNTER (OUTPATIENT)
Dept: RADIOLOGY | Facility: MEDICAL CENTER | Age: 60
End: 2020-08-26
Attending: UROLOGY
Payer: COMMERCIAL

## 2020-08-26 DIAGNOSIS — R31.21 ASYMPTOMATIC MICROSCOPIC HEMATURIA: ICD-10-CM

## 2020-08-26 PROCEDURE — 74178 CT ABD&PLV WO CNTR FLWD CNTR: CPT

## 2020-08-26 PROCEDURE — 700117 HCHG RX CONTRAST REV CODE 255: Performed by: UROLOGY

## 2020-08-26 RX ADMIN — IOHEXOL 100 ML: 350 INJECTION, SOLUTION INTRAVENOUS at 08:49

## 2020-09-21 ENCOUNTER — APPOINTMENT (RX ONLY)
Dept: URBAN - METROPOLITAN AREA CLINIC 4 | Facility: CLINIC | Age: 60
Setting detail: DERMATOLOGY
End: 2020-09-21

## 2020-09-21 DIAGNOSIS — L82.1 OTHER SEBORRHEIC KERATOSIS: ICD-10-CM

## 2020-09-21 DIAGNOSIS — D18.0 HEMANGIOMA: ICD-10-CM

## 2020-09-21 DIAGNOSIS — Z85.828 PERSONAL HISTORY OF OTHER MALIGNANT NEOPLASM OF SKIN: ICD-10-CM

## 2020-09-21 DIAGNOSIS — L81.4 OTHER MELANIN HYPERPIGMENTATION: ICD-10-CM

## 2020-09-21 PROBLEM — D18.01 HEMANGIOMA OF SKIN AND SUBCUTANEOUS TISSUE: Status: ACTIVE | Noted: 2020-09-21

## 2020-09-21 PROCEDURE — 99202 OFFICE O/P NEW SF 15 MIN: CPT

## 2020-09-21 PROCEDURE — ? OBSERVATION

## 2020-09-21 PROCEDURE — ? REFERRAL CORRESPONDENCE

## 2020-09-21 ASSESSMENT — LOCATION SIMPLE DESCRIPTION DERM
LOCATION SIMPLE: RIGHT UPPER BACK
LOCATION SIMPLE: UPPER BACK
LOCATION SIMPLE: RIGHT ELBOW
LOCATION SIMPLE: LEFT UPPER BACK
LOCATION SIMPLE: RIGHT FOREHEAD
LOCATION SIMPLE: LEFT UPPER ARM
LOCATION SIMPLE: ABDOMEN

## 2020-09-21 ASSESSMENT — LOCATION DETAILED DESCRIPTION DERM
LOCATION DETAILED: RIGHT INFERIOR MEDIAL UPPER BACK
LOCATION DETAILED: SUPERIOR THORACIC SPINE
LOCATION DETAILED: LEFT DISTAL LATERAL POSTERIOR UPPER ARM
LOCATION DETAILED: RIGHT SUPERIOR FOREHEAD
LOCATION DETAILED: RIGHT LATERAL ELBOW
LOCATION DETAILED: EPIGASTRIC SKIN
LOCATION DETAILED: LEFT MID-UPPER BACK

## 2020-09-21 ASSESSMENT — LOCATION ZONE DERM
LOCATION ZONE: FACE
LOCATION ZONE: ARM
LOCATION ZONE: TRUNK

## 2020-12-01 ENCOUNTER — PATIENT MESSAGE (OUTPATIENT)
Dept: MEDICAL GROUP | Facility: MEDICAL CENTER | Age: 60
End: 2020-12-01

## 2020-12-01 DIAGNOSIS — E03.8 OTHER SPECIFIED HYPOTHYROIDISM: ICD-10-CM

## 2020-12-01 DIAGNOSIS — I10 ESSENTIAL HYPERTENSION: ICD-10-CM

## 2020-12-02 RX ORDER — LEVOTHYROXINE SODIUM 0.03 MG/1
25 TABLET ORAL EVERY MORNING
Qty: 30 TAB | Refills: 0 | Status: SHIPPED | OUTPATIENT
Start: 2020-12-02 | End: 2021-01-08

## 2020-12-02 RX ORDER — LOSARTAN POTASSIUM 50 MG/1
TABLET ORAL
Qty: 30 TAB | Refills: 0 | Status: SHIPPED
Start: 2020-12-02 | End: 2021-01-27

## 2021-01-12 ENCOUNTER — PATIENT MESSAGE (OUTPATIENT)
Dept: MEDICAL GROUP | Facility: MEDICAL CENTER | Age: 61
End: 2021-01-12

## 2021-01-12 DIAGNOSIS — Z20.822 EXPOSURE TO COVID-19 VIRUS: ICD-10-CM

## 2021-01-12 DIAGNOSIS — Z12.5 ENCOUNTER FOR SCREENING FOR MALIGNANT NEOPLASM OF PROSTATE: ICD-10-CM

## 2021-01-12 DIAGNOSIS — I10 ESSENTIAL HYPERTENSION: ICD-10-CM

## 2021-01-12 DIAGNOSIS — Z00.00 PREVENTATIVE HEALTH CARE: ICD-10-CM

## 2021-01-12 DIAGNOSIS — E78.00 ELEVATED LDL CHOLESTEROL LEVEL: ICD-10-CM

## 2021-01-12 DIAGNOSIS — E07.9 THYROID DISEASE: ICD-10-CM

## 2021-01-20 ENCOUNTER — HOSPITAL ENCOUNTER (OUTPATIENT)
Dept: LAB | Facility: MEDICAL CENTER | Age: 61
End: 2021-01-20
Attending: INTERNAL MEDICINE
Payer: COMMERCIAL

## 2021-01-20 DIAGNOSIS — E78.00 ELEVATED LDL CHOLESTEROL LEVEL: ICD-10-CM

## 2021-01-20 DIAGNOSIS — Z00.00 PREVENTATIVE HEALTH CARE: ICD-10-CM

## 2021-01-20 DIAGNOSIS — I10 ESSENTIAL HYPERTENSION: ICD-10-CM

## 2021-01-20 DIAGNOSIS — E07.9 THYROID DISEASE: ICD-10-CM

## 2021-01-20 DIAGNOSIS — Z12.5 ENCOUNTER FOR SCREENING FOR MALIGNANT NEOPLASM OF PROSTATE: ICD-10-CM

## 2021-01-20 LAB
ALBUMIN SERPL BCP-MCNC: 4.1 G/DL (ref 3.2–4.9)
ALBUMIN/GLOB SERPL: 1.5 G/DL
ALP SERPL-CCNC: 71 U/L (ref 30–99)
ALT SERPL-CCNC: 41 U/L (ref 2–50)
ANION GAP SERPL CALC-SCNC: 8 MMOL/L (ref 7–16)
AST SERPL-CCNC: 46 U/L (ref 12–45)
BASOPHILS # BLD AUTO: 0.8 % (ref 0–1.8)
BASOPHILS # BLD: 0.06 K/UL (ref 0–0.12)
BILIRUB SERPL-MCNC: 0.6 MG/DL (ref 0.1–1.5)
BUN SERPL-MCNC: 22 MG/DL (ref 8–22)
CALCIUM SERPL-MCNC: 9.3 MG/DL (ref 8.5–10.5)
CHLORIDE SERPL-SCNC: 102 MMOL/L (ref 96–112)
CHOLEST SERPL-MCNC: 178 MG/DL (ref 100–199)
CO2 SERPL-SCNC: 25 MMOL/L (ref 20–33)
CREAT SERPL-MCNC: 1.15 MG/DL (ref 0.5–1.4)
EOSINOPHIL # BLD AUTO: 0.15 K/UL (ref 0–0.51)
EOSINOPHIL NFR BLD: 2 % (ref 0–6.9)
ERYTHROCYTE [DISTWIDTH] IN BLOOD BY AUTOMATED COUNT: 43.6 FL (ref 35.9–50)
FASTING STATUS PATIENT QL REPORTED: NORMAL
GLOBULIN SER CALC-MCNC: 2.8 G/DL (ref 1.9–3.5)
GLUCOSE SERPL-MCNC: 113 MG/DL (ref 65–99)
HCT VFR BLD AUTO: 43.1 % (ref 42–52)
HDLC SERPL-MCNC: 66 MG/DL
HGB BLD-MCNC: 14.7 G/DL (ref 14–18)
IMM GRANULOCYTES # BLD AUTO: 0.03 K/UL (ref 0–0.11)
IMM GRANULOCYTES NFR BLD AUTO: 0.4 % (ref 0–0.9)
LDLC SERPL CALC-MCNC: 102 MG/DL
LYMPHOCYTES # BLD AUTO: 1.55 K/UL (ref 1–4.8)
LYMPHOCYTES NFR BLD: 20.9 % (ref 22–41)
MCH RBC QN AUTO: 31.6 PG (ref 27–33)
MCHC RBC AUTO-ENTMCNC: 34.1 G/DL (ref 33.7–35.3)
MCV RBC AUTO: 92.7 FL (ref 81.4–97.8)
MONOCYTES # BLD AUTO: 0.67 K/UL (ref 0–0.85)
MONOCYTES NFR BLD AUTO: 9 % (ref 0–13.4)
NEUTROPHILS # BLD AUTO: 4.96 K/UL (ref 1.82–7.42)
NEUTROPHILS NFR BLD: 66.9 % (ref 44–72)
NRBC # BLD AUTO: 0 K/UL
NRBC BLD-RTO: 0 /100 WBC
PLATELET # BLD AUTO: 310 K/UL (ref 164–446)
PMV BLD AUTO: 9.2 FL (ref 9–12.9)
POTASSIUM SERPL-SCNC: 4.5 MMOL/L (ref 3.6–5.5)
PROT SERPL-MCNC: 6.9 G/DL (ref 6–8.2)
RBC # BLD AUTO: 4.65 M/UL (ref 4.7–6.1)
SODIUM SERPL-SCNC: 135 MMOL/L (ref 135–145)
TRIGL SERPL-MCNC: 51 MG/DL (ref 0–149)
WBC # BLD AUTO: 7.4 K/UL (ref 4.8–10.8)

## 2021-01-20 PROCEDURE — 80061 LIPID PANEL: CPT

## 2021-01-20 PROCEDURE — 36415 COLL VENOUS BLD VENIPUNCTURE: CPT

## 2021-01-20 PROCEDURE — 84153 ASSAY OF PSA TOTAL: CPT

## 2021-01-20 PROCEDURE — 85025 COMPLETE CBC W/AUTO DIFF WBC: CPT

## 2021-01-20 PROCEDURE — 84443 ASSAY THYROID STIM HORMONE: CPT

## 2021-01-20 PROCEDURE — 80053 COMPREHEN METABOLIC PANEL: CPT

## 2021-01-21 LAB
PSA SERPL-MCNC: 0.82 NG/ML (ref 0–4)
TSH SERPL DL<=0.005 MIU/L-ACNC: 3.91 UIU/ML (ref 0.38–5.33)

## 2021-01-27 ENCOUNTER — TELEMEDICINE (OUTPATIENT)
Dept: MEDICAL GROUP | Facility: MEDICAL CENTER | Age: 61
End: 2021-01-27
Payer: COMMERCIAL

## 2021-01-27 VITALS
DIASTOLIC BLOOD PRESSURE: 97 MMHG | HEART RATE: 76 BPM | HEIGHT: 75 IN | BODY MASS INDEX: 26.73 KG/M2 | TEMPERATURE: 97.3 F | WEIGHT: 215 LBS | SYSTOLIC BLOOD PRESSURE: 176 MMHG

## 2021-01-27 DIAGNOSIS — Z20.822 CLOSE EXPOSURE TO COVID-19 VIRUS: ICD-10-CM

## 2021-01-27 DIAGNOSIS — R74.8 ELEVATED LIVER ENZYMES: ICD-10-CM

## 2021-01-27 DIAGNOSIS — I10 ESSENTIAL HYPERTENSION: ICD-10-CM

## 2021-01-27 DIAGNOSIS — R73.01 IMPAIRED FASTING BLOOD SUGAR: ICD-10-CM

## 2021-01-27 DIAGNOSIS — K57.90 DIVERTICULOSIS: ICD-10-CM

## 2021-01-27 DIAGNOSIS — J30.2 SEASONAL ALLERGIES: ICD-10-CM

## 2021-01-27 DIAGNOSIS — J32.4 CHRONIC PANSINUSITIS: ICD-10-CM

## 2021-01-27 DIAGNOSIS — E78.00 ELEVATED LDL CHOLESTEROL LEVEL: ICD-10-CM

## 2021-01-27 PROBLEM — J34.3 HYPERTROPHY OF NASAL TURBINATES: Status: ACTIVE | Noted: 2021-01-27

## 2021-01-27 PROCEDURE — 99214 OFFICE O/P EST MOD 30 MIN: CPT | Mod: 95,CR | Performed by: INTERNAL MEDICINE

## 2021-01-27 RX ORDER — MONTELUKAST SODIUM 10 MG/1
10 TABLET ORAL DAILY
Qty: 30 TAB | Refills: 3 | Status: SHIPPED | OUTPATIENT
Start: 2021-01-27 | End: 2021-08-19 | Stop reason: SDUPTHER

## 2021-01-27 RX ORDER — LOSARTAN POTASSIUM 50 MG/1
75 TABLET ORAL DAILY
Qty: 45 TAB | Refills: 1 | Status: SHIPPED | OUTPATIENT
Start: 2021-01-27 | End: 2021-04-22

## 2021-01-27 RX ORDER — AZELASTINE 1 MG/ML
1 SPRAY, METERED NASAL 2 TIMES DAILY
Qty: 30 ML | Refills: 3 | Status: SHIPPED | OUTPATIENT
Start: 2021-01-27 | End: 2022-03-03

## 2021-01-27 ASSESSMENT — FIBROSIS 4 INDEX: FIB4 SCORE: 1.37

## 2021-01-27 ASSESSMENT — PATIENT HEALTH QUESTIONNAIRE - PHQ9: CLINICAL INTERPRETATION OF PHQ2 SCORE: 0

## 2021-02-09 DIAGNOSIS — E03.8 OTHER SPECIFIED HYPOTHYROIDISM: ICD-10-CM

## 2021-02-09 RX ORDER — LEVOTHYROXINE SODIUM 0.03 MG/1
TABLET ORAL
Qty: 90 TAB | Refills: 1 | Status: SHIPPED | OUTPATIENT
Start: 2021-02-09 | End: 2022-03-03

## 2021-03-15 DIAGNOSIS — Z23 NEED FOR VACCINATION: ICD-10-CM

## 2021-03-24 ENCOUNTER — IMMUNIZATION (OUTPATIENT)
Dept: FAMILY PLANNING/WOMEN'S HEALTH CLINIC | Facility: IMMUNIZATION CENTER | Age: 61
End: 2021-03-24
Attending: INTERNAL MEDICINE
Payer: COMMERCIAL

## 2021-03-24 DIAGNOSIS — Z23 ENCOUNTER FOR VACCINATION: Primary | ICD-10-CM

## 2021-03-24 DIAGNOSIS — Z23 NEED FOR VACCINATION: ICD-10-CM

## 2021-03-24 PROCEDURE — 0001A PFIZER SARS-COV-2 VACCINE: CPT | Performed by: INTERNAL MEDICINE

## 2021-03-24 PROCEDURE — 91300 PFIZER SARS-COV-2 VACCINE: CPT | Performed by: INTERNAL MEDICINE

## 2021-04-16 ENCOUNTER — IMMUNIZATION (OUTPATIENT)
Dept: FAMILY PLANNING/WOMEN'S HEALTH CLINIC | Facility: IMMUNIZATION CENTER | Age: 61
End: 2021-04-16
Payer: COMMERCIAL

## 2021-04-16 DIAGNOSIS — Z23 ENCOUNTER FOR VACCINATION: Primary | ICD-10-CM

## 2021-04-16 PROCEDURE — 0002A PFIZER SARS-COV-2 VACCINE: CPT | Performed by: INTERNAL MEDICINE

## 2021-04-16 PROCEDURE — 91300 PFIZER SARS-COV-2 VACCINE: CPT | Performed by: INTERNAL MEDICINE

## 2021-04-22 RX ORDER — LOSARTAN POTASSIUM 50 MG/1
TABLET ORAL
Qty: 45 TABLET | Refills: 3 | Status: SHIPPED | OUTPATIENT
Start: 2021-04-22 | End: 2021-08-19 | Stop reason: SDUPTHER

## 2021-05-07 ENCOUNTER — PATIENT MESSAGE (OUTPATIENT)
Dept: MEDICAL GROUP | Facility: MEDICAL CENTER | Age: 61
End: 2021-05-07

## 2021-05-07 DIAGNOSIS — N52.8 OTHER MALE ERECTILE DYSFUNCTION: ICD-10-CM

## 2021-06-02 VITALS — SYSTOLIC BLOOD PRESSURE: 130 MMHG | DIASTOLIC BLOOD PRESSURE: 82 MMHG

## 2021-06-02 RX ORDER — TADALAFIL 10 MG/1
10 TABLET ORAL PRN
Qty: 10 TABLET | Refills: 11 | Status: CANCELLED | OUTPATIENT
Start: 2021-06-02

## 2021-07-06 RX ORDER — TADALAFIL 10 MG/1
10 TABLET ORAL PRN
Qty: 10 TABLET | Refills: 11 | Status: SHIPPED
Start: 2021-07-06 | End: 2022-01-18 | Stop reason: SDUPTHER

## 2021-07-21 RX ORDER — LEVOTHYROXINE SODIUM 0.03 MG/1
TABLET ORAL
Qty: 90 TABLET | Refills: 0 | Status: SHIPPED | OUTPATIENT
Start: 2021-07-21 | End: 2021-11-05

## 2021-07-21 NOTE — TELEPHONE ENCOUNTER
Received request via: Pharmacy    Was the patient seen in the last year in this department? Yes    Does the patient have an active prescription (recently filled or refills available) for medication(s) requested? No     Requested Prescriptions     Pending Prescriptions Disp Refills   • levothyroxine (SYNTHROID) 25 MCG Tab [Pharmacy Med Name: Levothyroxine Sodium Oral Tablet 25 MCG] 90 tablet 0     Sig: TAKE 1 TABLET BY MOUTH DAILY IN THE MORNING ON AN EMPTY STOMACH

## 2021-08-19 ENCOUNTER — PATIENT MESSAGE (OUTPATIENT)
Dept: MEDICAL GROUP | Facility: MEDICAL CENTER | Age: 61
End: 2021-08-19

## 2021-08-19 DIAGNOSIS — J32.4 CHRONIC PANSINUSITIS: ICD-10-CM

## 2021-08-19 DIAGNOSIS — J30.2 SEASONAL ALLERGIES: ICD-10-CM

## 2021-08-19 RX ORDER — LOSARTAN POTASSIUM 50 MG/1
50 TABLET ORAL
Qty: 90 TABLET | Refills: 3 | Status: SHIPPED | OUTPATIENT
Start: 2021-08-19 | End: 2021-12-16 | Stop reason: SDUPTHER

## 2021-08-19 RX ORDER — MONTELUKAST SODIUM 10 MG/1
10 TABLET ORAL DAILY
Qty: 90 TABLET | Refills: 3 | Status: SHIPPED | OUTPATIENT
Start: 2021-08-19 | End: 2022-10-06

## 2021-08-19 NOTE — PATIENT COMMUNICATION
Received request via: Patient    Was the patient seen in the last year in this department? Yes    Does the patient have an active prescription (recently filled or refills available) for medication(s) requested? No     Requested Prescriptions     Pending Prescriptions Disp Refills   • losartan (COZAAR) 50 MG Tab 90 Tablet      Sig: Take 1 Tablet by mouth every day.   • montelukast (SINGULAIR) 10 MG Tab 90 Tablet      Sig: Take 1 Tablet by mouth every day.       Pt request 90 day supply

## 2021-11-05 RX ORDER — LEVOTHYROXINE SODIUM 0.03 MG/1
TABLET ORAL
Qty: 90 TABLET | Refills: 0 | Status: SHIPPED | OUTPATIENT
Start: 2021-11-05 | End: 2022-01-31

## 2022-01-17 ENCOUNTER — TELEPHONE (OUTPATIENT)
Dept: MEDICAL GROUP | Facility: MEDICAL CENTER | Age: 62
End: 2022-01-17

## 2022-01-17 DIAGNOSIS — N52.8 OTHER MALE ERECTILE DYSFUNCTION: ICD-10-CM

## 2022-01-17 NOTE — TELEPHONE ENCOUNTER
1. Name: Thomas Huber      Call Back Number: [phone] (home) [phone] (work)        How would the patient prefer to be contacted with a response: Phone call OK to leave a detailed message    Pt Messi called in and said he needed a refill on:    - tadalafil (CIALIS) 10 MG tablet    He said Dr Herbert filled prior med refill for another medication. He asked if blood work on file advised what Dr Granger ordered for him and spouse was in the system. He said if MA can call him to talk about where the refill needs to go.    Thank you

## 2022-01-18 DIAGNOSIS — N52.8 OTHER MALE ERECTILE DYSFUNCTION: ICD-10-CM

## 2022-01-18 RX ORDER — TADALAFIL 10 MG/1
10 TABLET ORAL PRN
Qty: 10 TABLET | Refills: 11 | Status: SHIPPED
Start: 2022-01-18 | End: 2023-01-21 | Stop reason: SDUPTHER

## 2022-02-15 ENCOUNTER — TELEPHONE (OUTPATIENT)
Dept: MEDICAL GROUP | Facility: MEDICAL CENTER | Age: 62
End: 2022-02-15

## 2022-02-15 NOTE — TELEPHONE ENCOUNTER
Phone Number Called: [phone]    Call outcome: Spoke to patient regarding message below.    Message: Dr. Herbert refilled Losartan for 3 months, recommended patient establish care with new PCP for any future Rx requests. Patient stated he will give us a call back to schedule an appointment.

## 2022-02-28 SDOH — ECONOMIC STABILITY: HOUSING INSECURITY: IN THE LAST 12 MONTHS, HOW MANY PLACES HAVE YOU LIVED?: 2

## 2022-02-28 SDOH — ECONOMIC STABILITY: INCOME INSECURITY: IN THE LAST 12 MONTHS, WAS THERE A TIME WHEN YOU WERE NOT ABLE TO PAY THE MORTGAGE OR RENT ON TIME?: NO

## 2022-02-28 SDOH — ECONOMIC STABILITY: HOUSING INSECURITY
IN THE LAST 12 MONTHS, WAS THERE A TIME WHEN YOU DID NOT HAVE A STEADY PLACE TO SLEEP OR SLEPT IN A SHELTER (INCLUDING NOW)?: NO

## 2022-02-28 SDOH — HEALTH STABILITY: PHYSICAL HEALTH: ON AVERAGE, HOW MANY MINUTES DO YOU ENGAGE IN EXERCISE AT THIS LEVEL?: 120 MIN

## 2022-02-28 SDOH — HEALTH STABILITY: PHYSICAL HEALTH: ON AVERAGE, HOW MANY DAYS PER WEEK DO YOU ENGAGE IN MODERATE TO STRENUOUS EXERCISE (LIKE A BRISK WALK)?: 5 DAYS

## 2022-02-28 SDOH — ECONOMIC STABILITY: FOOD INSECURITY: WITHIN THE PAST 12 MONTHS, THE FOOD YOU BOUGHT JUST DIDN'T LAST AND YOU DIDN'T HAVE MONEY TO GET MORE.: NEVER TRUE

## 2022-02-28 SDOH — ECONOMIC STABILITY: INCOME INSECURITY: HOW HARD IS IT FOR YOU TO PAY FOR THE VERY BASICS LIKE FOOD, HOUSING, MEDICAL CARE, AND HEATING?: NOT HARD AT ALL

## 2022-02-28 SDOH — ECONOMIC STABILITY: TRANSPORTATION INSECURITY
IN THE PAST 12 MONTHS, HAS THE LACK OF TRANSPORTATION KEPT YOU FROM MEDICAL APPOINTMENTS OR FROM GETTING MEDICATIONS?: NO

## 2022-02-28 SDOH — ECONOMIC STABILITY: TRANSPORTATION INSECURITY
IN THE PAST 12 MONTHS, HAS LACK OF RELIABLE TRANSPORTATION KEPT YOU FROM MEDICAL APPOINTMENTS, MEETINGS, WORK OR FROM GETTING THINGS NEEDED FOR DAILY LIVING?: NO

## 2022-02-28 SDOH — ECONOMIC STABILITY: FOOD INSECURITY: WITHIN THE PAST 12 MONTHS, YOU WORRIED THAT YOUR FOOD WOULD RUN OUT BEFORE YOU GOT MONEY TO BUY MORE.: NEVER TRUE

## 2022-02-28 SDOH — HEALTH STABILITY: MENTAL HEALTH
STRESS IS WHEN SOMEONE FEELS TENSE, NERVOUS, ANXIOUS, OR CAN'T SLEEP AT NIGHT BECAUSE THEIR MIND IS TROUBLED. HOW STRESSED ARE YOU?: ONLY A LITTLE

## 2022-02-28 SDOH — ECONOMIC STABILITY: TRANSPORTATION INSECURITY
IN THE PAST 12 MONTHS, HAS LACK OF TRANSPORTATION KEPT YOU FROM MEETINGS, WORK, OR FROM GETTING THINGS NEEDED FOR DAILY LIVING?: NO

## 2022-02-28 ASSESSMENT — SOCIAL DETERMINANTS OF HEALTH (SDOH)
DO YOU BELONG TO ANY CLUBS OR ORGANIZATIONS SUCH AS CHURCH GROUPS UNIONS, FRATERNAL OR ATHLETIC GROUPS, OR SCHOOL GROUPS?: YES
HOW OFTEN DO YOU ATTENT MEETINGS OF THE CLUB OR ORGANIZATION YOU BELONG TO?: MORE THAN 4 TIMES PER YEAR
IN A TYPICAL WEEK, HOW MANY TIMES DO YOU TALK ON THE PHONE WITH FAMILY, FRIENDS, OR NEIGHBORS?: MORE THAN THREE TIMES A WEEK
HOW OFTEN DO YOU ATTENT MEETINGS OF THE CLUB OR ORGANIZATION YOU BELONG TO?: MORE THAN 4 TIMES PER YEAR
HOW OFTEN DO YOU ATTEND CHURCH OR RELIGIOUS SERVICES?: MORE THAN 4 TIMES PER YEAR
HOW HARD IS IT FOR YOU TO PAY FOR THE VERY BASICS LIKE FOOD, HOUSING, MEDICAL CARE, AND HEATING?: NOT HARD AT ALL
IN A TYPICAL WEEK, HOW MANY TIMES DO YOU TALK ON THE PHONE WITH FAMILY, FRIENDS, OR NEIGHBORS?: MORE THAN THREE TIMES A WEEK
WITHIN THE PAST 12 MONTHS, YOU WORRIED THAT YOUR FOOD WOULD RUN OUT BEFORE YOU GOT THE MONEY TO BUY MORE: NEVER TRUE
HOW OFTEN DO YOU HAVE SIX OR MORE DRINKS ON ONE OCCASION: NEVER
HOW OFTEN DO YOU HAVE A DRINK CONTAINING ALCOHOL: 2-3 TIMES A WEEK
HOW OFTEN DO YOU ATTEND CHURCH OR RELIGIOUS SERVICES?: MORE THAN 4 TIMES PER YEAR
HOW OFTEN DO YOU GET TOGETHER WITH FRIENDS OR RELATIVES?: TWICE A WEEK
HOW OFTEN DO YOU GET TOGETHER WITH FRIENDS OR RELATIVES?: TWICE A WEEK
HOW MANY DRINKS CONTAINING ALCOHOL DO YOU HAVE ON A TYPICAL DAY WHEN YOU ARE DRINKING: 1 OR 2
DO YOU BELONG TO ANY CLUBS OR ORGANIZATIONS SUCH AS CHURCH GROUPS UNIONS, FRATERNAL OR ATHLETIC GROUPS, OR SCHOOL GROUPS?: YES

## 2022-02-28 ASSESSMENT — LIFESTYLE VARIABLES
HOW OFTEN DO YOU HAVE SIX OR MORE DRINKS ON ONE OCCASION: NEVER
HOW MANY STANDARD DRINKS CONTAINING ALCOHOL DO YOU HAVE ON A TYPICAL DAY: 1 OR 2
HOW OFTEN DO YOU HAVE A DRINK CONTAINING ALCOHOL: 2-3 TIMES A WEEK

## 2022-03-03 ENCOUNTER — HOSPITAL ENCOUNTER (OUTPATIENT)
Dept: RADIOLOGY | Facility: MEDICAL CENTER | Age: 62
End: 2022-03-03
Attending: FAMILY MEDICINE
Payer: COMMERCIAL

## 2022-03-03 ENCOUNTER — OFFICE VISIT (OUTPATIENT)
Dept: MEDICAL GROUP | Facility: LAB | Age: 62
End: 2022-03-03
Payer: COMMERCIAL

## 2022-03-03 ENCOUNTER — HOSPITAL ENCOUNTER (OUTPATIENT)
Dept: LAB | Facility: MEDICAL CENTER | Age: 62
End: 2022-03-03
Attending: FAMILY MEDICINE
Payer: COMMERCIAL

## 2022-03-03 VITALS
BODY MASS INDEX: 24.74 KG/M2 | HEART RATE: 62 BPM | DIASTOLIC BLOOD PRESSURE: 84 MMHG | HEIGHT: 75 IN | SYSTOLIC BLOOD PRESSURE: 136 MMHG | RESPIRATION RATE: 12 BRPM | OXYGEN SATURATION: 99 % | TEMPERATURE: 97.3 F | WEIGHT: 199 LBS

## 2022-03-03 DIAGNOSIS — M25.561 ACUTE PAIN OF RIGHT KNEE: ICD-10-CM

## 2022-03-03 DIAGNOSIS — Z86.73 HISTORY OF STROKE: ICD-10-CM

## 2022-03-03 DIAGNOSIS — Z00.00 WELLNESS EXAMINATION: ICD-10-CM

## 2022-03-03 DIAGNOSIS — Z86.19: ICD-10-CM

## 2022-03-03 DIAGNOSIS — E03.8 OTHER SPECIFIED HYPOTHYROIDISM: ICD-10-CM

## 2022-03-03 DIAGNOSIS — Z98.890 H/O BASAL CELL CARCINOMA EXCISION: ICD-10-CM

## 2022-03-03 DIAGNOSIS — Z85.828 H/O BASAL CELL CARCINOMA EXCISION: ICD-10-CM

## 2022-03-03 DIAGNOSIS — D12.6 TUBULAR ADENOMA OF COLON: ICD-10-CM

## 2022-03-03 DIAGNOSIS — Z23 NEED FOR VACCINATION: ICD-10-CM

## 2022-03-03 LAB
ALBUMIN SERPL BCP-MCNC: 4.4 G/DL (ref 3.2–4.9)
ALBUMIN/GLOB SERPL: 1.7 G/DL
ALP SERPL-CCNC: 66 U/L (ref 30–99)
ALT SERPL-CCNC: 16 U/L (ref 2–50)
ANION GAP SERPL CALC-SCNC: 12 MMOL/L (ref 7–16)
AST SERPL-CCNC: 19 U/L (ref 12–45)
BASOPHILS # BLD AUTO: 1 % (ref 0–1.8)
BASOPHILS # BLD: 0.05 K/UL (ref 0–0.12)
BILIRUB SERPL-MCNC: 1 MG/DL (ref 0.1–1.5)
BUN SERPL-MCNC: 21 MG/DL (ref 8–22)
CALCIUM SERPL-MCNC: 9.6 MG/DL (ref 8.5–10.5)
CHLORIDE SERPL-SCNC: 103 MMOL/L (ref 96–112)
CHOLEST SERPL-MCNC: 188 MG/DL (ref 100–199)
CO2 SERPL-SCNC: 23 MMOL/L (ref 20–33)
CREAT SERPL-MCNC: 1.21 MG/DL (ref 0.5–1.4)
EOSINOPHIL # BLD AUTO: 0.06 K/UL (ref 0–0.51)
EOSINOPHIL NFR BLD: 1.2 % (ref 0–6.9)
ERYTHROCYTE [DISTWIDTH] IN BLOOD BY AUTOMATED COUNT: 43.7 FL (ref 35.9–50)
FASTING STATUS PATIENT QL REPORTED: NORMAL
GLOBULIN SER CALC-MCNC: 2.6 G/DL (ref 1.9–3.5)
GLUCOSE SERPL-MCNC: 98 MG/DL (ref 65–99)
HCT VFR BLD AUTO: 45.3 % (ref 42–52)
HDLC SERPL-MCNC: 80 MG/DL
HGB BLD-MCNC: 15.5 G/DL (ref 14–18)
IMM GRANULOCYTES # BLD AUTO: 0.01 K/UL (ref 0–0.11)
IMM GRANULOCYTES NFR BLD AUTO: 0.2 % (ref 0–0.9)
LDLC SERPL CALC-MCNC: 100 MG/DL
LYMPHOCYTES # BLD AUTO: 1.63 K/UL (ref 1–4.8)
LYMPHOCYTES NFR BLD: 32.1 % (ref 22–41)
MCH RBC QN AUTO: 31.4 PG (ref 27–33)
MCHC RBC AUTO-ENTMCNC: 34.2 G/DL (ref 33.7–35.3)
MCV RBC AUTO: 91.9 FL (ref 81.4–97.8)
MONOCYTES # BLD AUTO: 0.47 K/UL (ref 0–0.85)
MONOCYTES NFR BLD AUTO: 9.3 % (ref 0–13.4)
NEUTROPHILS # BLD AUTO: 2.86 K/UL (ref 1.82–7.42)
NEUTROPHILS NFR BLD: 56.2 % (ref 44–72)
NRBC # BLD AUTO: 0 K/UL
NRBC BLD-RTO: 0 /100 WBC
PLATELET # BLD AUTO: 310 K/UL (ref 164–446)
PMV BLD AUTO: 9.4 FL (ref 9–12.9)
POTASSIUM SERPL-SCNC: 4.4 MMOL/L (ref 3.6–5.5)
PROT SERPL-MCNC: 7 G/DL (ref 6–8.2)
PSA SERPL-MCNC: 1.07 NG/ML (ref 0–4)
RBC # BLD AUTO: 4.93 M/UL (ref 4.7–6.1)
SODIUM SERPL-SCNC: 138 MMOL/L (ref 135–145)
T3 SERPL-MCNC: 97.6 NG/DL (ref 60–181)
T4 FREE SERPL-MCNC: 1.57 NG/DL (ref 0.93–1.7)
TRIGL SERPL-MCNC: 41 MG/DL (ref 0–149)
TSH SERPL DL<=0.005 MIU/L-ACNC: 2.94 UIU/ML (ref 0.38–5.33)
WBC # BLD AUTO: 5.1 K/UL (ref 4.8–10.8)

## 2022-03-03 PROCEDURE — 73562 X-RAY EXAM OF KNEE 3: CPT | Mod: RT

## 2022-03-03 PROCEDURE — 80053 COMPREHEN METABOLIC PANEL: CPT

## 2022-03-03 PROCEDURE — 90670 PCV13 VACCINE IM: CPT | Performed by: FAMILY MEDICINE

## 2022-03-03 PROCEDURE — 80061 LIPID PANEL: CPT

## 2022-03-03 PROCEDURE — 36415 COLL VENOUS BLD VENIPUNCTURE: CPT

## 2022-03-03 PROCEDURE — 84480 ASSAY TRIIODOTHYRONINE (T3): CPT

## 2022-03-03 PROCEDURE — 85025 COMPLETE CBC W/AUTO DIFF WBC: CPT

## 2022-03-03 PROCEDURE — 99396 PREV VISIT EST AGE 40-64: CPT | Mod: 25 | Performed by: FAMILY MEDICINE

## 2022-03-03 PROCEDURE — 84443 ASSAY THYROID STIM HORMONE: CPT

## 2022-03-03 PROCEDURE — 90471 IMMUNIZATION ADMIN: CPT | Performed by: FAMILY MEDICINE

## 2022-03-03 PROCEDURE — 84153 ASSAY OF PSA TOTAL: CPT

## 2022-03-03 PROCEDURE — 84439 ASSAY OF FREE THYROXINE: CPT

## 2022-03-03 RX ORDER — BILBERRY FRUIT 1000 MG
CAPSULE ORAL
COMMUNITY

## 2022-03-03 RX ORDER — THYROID 60 MG/1
TABLET ORAL
COMMUNITY
End: 2022-03-03

## 2022-03-03 ASSESSMENT — PATIENT HEALTH QUESTIONNAIRE - PHQ9: CLINICAL INTERPRETATION OF PHQ2 SCORE: 0

## 2022-03-03 ASSESSMENT — FIBROSIS 4 INDEX: FIB4 SCORE: 1.39

## 2022-03-03 NOTE — PROGRESS NOTES
Chief Complaint   Patient presents with   • New Patient         Thomas Huber is a 61 y.o. male here to establish care and for evaluation and management of:        HPI:  Previous patient of Dr Granger. Due for some blood work. Feels pretty good in general. Left [place], still has a condo there.   Has some right knee pain. Lots of ortho on the left knee. No catching or buckling. Pushing up out of a chair, pushing into a ski boot is what causes the pain.   Diet: No real restrictions. Some intermittent fasting, 24 hours on sunday, maybe too much sugar, but monitors this. Does eat carbs. Some. 2 meals a day. Loves cheese.   Exercise: Skiing. Tennis 2-3 times per week, hiking as well.   Sleep: Normally good, 6 minimum, 7-8 max. Sometimes getting up to urinate, then doesn't get back to bed.   Colonoscopy: 2/9/22  Immunizations:   Sees urology. Trace blood in the urine. Reported to be fine.   H/o mini stroke after rotator cuff surgery.    Wears an off  brace on left knee.     No Known Allergies    Current medicines (including changes today)  Current Outpatient Medications   Medication Sig Dispense Refill   • losartan (COZAAR) 50 MG Tab Take 1.5 Tablets by mouth every day for 90 days. 135 Tablet 0   • levothyroxine (SYNTHROID) 25 MCG Tab TAKE ONE TABLET BY MOUTH EVERY MORNING ON AN EMPTY STOMACH 90 Tablet 0   • tadalafil (CIALIS) 10 MG tablet Take 1 Tablet by mouth as needed for Erectile Dysfunction. 10 Tablet 11   • montelukast (SINGULAIR) 10 MG Tab Take 1 Tablet by mouth every day. 90 Tablet 3   • aspirin 81 MG tablet Take 81 mg by mouth every day.     • levothyroxine (SYNTHROID) 25 MCG Tab TAKE ONE TABLET BY MOUTH IN THE MORNING on an empty stomach 90 Tab 1   • azelastine (ASTELIN) 137 MCG/SPRAY nasal spray Administer 1 Spray into affected nostril(S) 2 times a day. 30 mL 3   • levothyroxine (SYNTHROID) 25 MCG Tab Take 1 Tab by mouth Every morning on an empty stomach. 90 Tab 0     No current  "facility-administered medications for this visit.     He  has a past medical history of Hypertension, Stroke (HCC), and Thyroid disease.  He  has a past surgical history that includes tonsillectomy and other.  Social History     Tobacco Use   • Smoking status: Former Smoker     Packs/day: 0.00     Years: 13.00     Pack years: 0.00     Types: Cigarettes     Quit date: 1996     Years since quittin.5   • Smokeless tobacco: Never Used   • Tobacco comment: Light social smoker; 1 pack/mo   Vaping Use   • Vaping Use: Never used   Substance Use Topics   • Alcohol use: Yes     Alcohol/week: 1.2 - 1.8 oz     Types: 2 - 3 Cans of beer per week     Comment: Depends on the day for type    • Drug use: Not Currently     Social History     Social History Narrative   • Not on file     Family History   Problem Relation Age of Onset   • No Known Problems Mother    • Cancer Father         bladder cancer; no tob   • Other Brother         memory/neuro issues ? alzheimers/ PD   • Other Brother         memory/neuro issues ? alzheimers/ PD   • Other Brother         memory/neuro issues ? alzheimers/ PD   • Other Brother         memory/neuro issues ? alzheimers/ PD   • No Known Problems Maternal Grandmother    • No Known Problems Maternal Grandfather    • Other Paternal Grandmother         dementia   • Heart Disease Paternal Grandfather      Family Status   Relation Name Status   • Mo  Alive, age 85y   • Fa   at age 55   • Bro  (Not Specified)   • Bro  (Not Specified)   • Bro  (Not Specified)   • Bro  (Not Specified)   • MGMo  (Not Specified)   • MGFa  (Not Specified)   • PGMo  (Not Specified)   • PGFa  (Not Specified)         ROS  No fever or chills.  No nausea or vomiting.  No chest pain or palpitations.  No cough or SOB.  No pain with urination or hematuria.  No black or bloody stools.  All other systems reviewed and are negative     Objective:     Pulse 62   Temp 36.3 °C (97.3 °F)   Resp 12   Ht 1.905 m (6' 3\")   Wt " 90.3 kg (199 lb)   SpO2 99%  Body mass index is 24.87 kg/m².  Physical Exam:      Well developed, well nourished.  Alert, oriented in no acute distress.  Psych: Eye contact is good, speech goal directed, affect calm  Eyes: conjunctiva non-injected, sclera non-icteric.  Ears: Pinna normal. TM pearly gray.   Nose: Nares are patent.  Normal mucosa  Mouth: Oral mucous membranes pink and moist with no lesions.  Neck Supple.  No adenopathy or masses in the neck or supraclavicular regions. No thyromegaly  Lungs: clear to auscultation bilaterally with good excursion. No wheezes or rhonchi  CV: regular rate and rhythm. No murmur  Abdomen: soft, nontender, no masses or organomegaly.  No rebound or gaurding  Ext: no edema, color normal, vascularity normal, temperature normal      Assessment and Plan:   The following treatment plan was discussed  1. Wellness examination  Reviewed diet and exercise.  Regular routine screening labs are ordered as well.  Discussed activity and importance of sleep as well.  We will get updated on his pneumonia vaccine as well.  Otherwise very caught up on health maintenance.  - Comp Metabolic Panel; Future  - Lipid Profile; Future  - PROSTATE SPECIFIC AG SCREENING; Future    2. Tubular adenoma of colon  Discussed recent colonoscopy with tubular adenoma and repeat in 3 years.    3. History of hepatitis E virus infection  We will continue to monitor liver functions.  He is relatively asymptomatic with this.    4. Other specified hypothyroidism  We will get blood work done to ensure this continues to be controlled.  - CBC WITH DIFFERENTIAL; Future  - TSH; Future  - FREE THYROXINE; Future  - TRIIDOTHYRONINE; Future    5. History of stroke  Continue baby aspirin as is.  Continue blood pressure control and risk factor management with lifestyle.    6. Need for vaccination    - Prevnar-13 Vaccine (PCV13)    7. Acute pain of right knee  Discussed possibility for articular damage based on his symptoms that  he reports.  We will get an x-ray to start with and see how much arthritis is present.  May consider corticosteroid injection later this week or early next week before he travels.  - DX-KNEE 3 VIEWS RIGHT; Future        Followup: No follow-ups on file.

## 2022-03-07 ENCOUNTER — OFFICE VISIT (OUTPATIENT)
Dept: MEDICAL GROUP | Facility: LAB | Age: 62
End: 2022-03-07
Payer: COMMERCIAL

## 2022-03-07 VITALS
RESPIRATION RATE: 12 BRPM | HEART RATE: 62 BPM | SYSTOLIC BLOOD PRESSURE: 140 MMHG | OXYGEN SATURATION: 99 % | DIASTOLIC BLOOD PRESSURE: 86 MMHG | BODY MASS INDEX: 24.87 KG/M2 | WEIGHT: 200 LBS | HEIGHT: 75 IN | TEMPERATURE: 97.1 F

## 2022-03-07 DIAGNOSIS — M25.561 PATELLOFEMORAL ARTHRALGIA OF RIGHT KNEE: ICD-10-CM

## 2022-03-07 PROCEDURE — 20610 DRAIN/INJ JOINT/BURSA W/O US: CPT | Performed by: FAMILY MEDICINE

## 2022-03-07 RX ORDER — METHYLPREDNISOLONE ACETATE 40 MG/ML
40 INJECTION, SUSPENSION INTRA-ARTICULAR; INTRALESIONAL; INTRAMUSCULAR; SOFT TISSUE ONCE
Status: COMPLETED | OUTPATIENT
Start: 2022-03-07 | End: 2022-03-07

## 2022-03-07 RX ADMIN — METHYLPREDNISOLONE ACETATE 40 MG: 40 INJECTION, SUSPENSION INTRA-ARTICULAR; INTRALESIONAL; INTRAMUSCULAR; SOFT TISSUE at 17:11

## 2022-03-07 ASSESSMENT — FIBROSIS 4 INDEX: FIB4 SCORE: 0.92

## 2022-03-08 NOTE — PROCEDURES
Right knee injection: Verbal and written informed consent obtained. Risks and benefits of procedure explained. Site marking and time out per protocol.  Right knee prepped in sterile fashion with chlorprep. Anterior lateral approach used to inject 4 ml Lidocaine 1% and 1 ml Depo Medrol 40 mg/ml via 22 gauge 1 1/2 inch needle. Patient tolerated procedure well. Bandaid placed and post care instructions discussed.

## 2022-03-08 NOTE — PROGRESS NOTES
"Subjective:     Chief Complaint   Patient presents with   • Injections     Knee right         HPI:   Thomas presents today for right knee injection. Imaging with plain films showed some possible calcific loose bodies, mild arthritis as well.   Has history of left knee scope and need for replacement eventually.           Current Outpatient Medications Ordered in Epic   Medication Sig Dispense Refill   • VITAMIN D, CHOLECALCIFEROL, PO      • Saw [place] 1000 MG Cap Take  by mouth.     • losartan (COZAAR) 50 MG Tab Take 1.5 Tablets by mouth every day for 90 days. 135 Tablet 0   • levothyroxine (SYNTHROID) 25 MCG Tab TAKE ONE TABLET BY MOUTH EVERY MORNING ON AN EMPTY STOMACH 90 Tablet 0   • tadalafil (CIALIS) 10 MG tablet Take 1 Tablet by mouth as needed for Erectile Dysfunction. 10 Tablet 11   • montelukast (SINGULAIR) 10 MG Tab Take 1 Tablet by mouth every day. 90 Tablet 3   • aspirin 81 MG tablet Take 81 mg by mouth every day.       No current [facility]-ordered facility-administered medications on file.         ROS:  Gen: no fevers/chills, no changes in weight  Eyes: no changes in vision  ENT: no sore throat, no hearing loss, no bloody nose  Pulm: no sob, no cough  CV: no chest pain, no palpitations  GI: no nausea/vomiting, no diarrhea  : no dysuria  MSk: no myalgias  Skin: no rash  Neuro: no headaches, no numbness/tingling  Heme/Lymph: no easy bruising      Objective:     Exam:  /86 (BP Location: Right arm, Patient Position: Sitting, BP Cuff Size: Adult)   Pulse 62   Temp 36.2 °C (97.1 °F)   Resp 12   Ht 1.905 m (6' 3\")   Wt 90.7 kg (200 lb)   SpO2 99%   BMI 25.00 kg/m²  Body mass index is 25 kg/m².    Gen: Alert and oriented, No apparent distress.  Neck: Neck is supple without lymphadenopathy.  Lungs: Normal effort, CTA bilaterally, no wheezes, rhonchi, or rales  CV: Regular rate and rhythm. No murmurs, rubs, or gallops.  Ext: No clubbing, cyanosis, edema.      Assessment & Plan:     61 y.o. male with " the following -     1. Patellofemoral arthralgia of right knee  See procedure note.            No follow-ups on file.    Please note that this dictation was created using voice recognition software. I have made every reasonable attempt to correct obvious errors, but I expect that there are errors of grammar and possibly content that I did not discover before finalizing the note.

## 2022-05-05 RX ORDER — LEVOTHYROXINE SODIUM 0.03 MG/1
TABLET ORAL
Qty: 90 TABLET | Refills: 0 | Status: SHIPPED | OUTPATIENT
Start: 2022-05-05 | End: 2022-05-12 | Stop reason: SDUPTHER

## 2022-05-05 RX ORDER — LOSARTAN POTASSIUM 50 MG/1
TABLET ORAL
Qty: 135 TABLET | Refills: 0 | Status: SHIPPED | OUTPATIENT
Start: 2022-05-05 | End: 2022-05-09 | Stop reason: SDUPTHER

## 2022-05-12 RX ORDER — LEVOTHYROXINE SODIUM 0.03 MG/1
25 TABLET ORAL
Qty: 90 TABLET | Refills: 3 | Status: SHIPPED | OUTPATIENT
Start: 2022-05-12 | End: 2023-04-13 | Stop reason: SDUPTHER

## 2022-05-15 ENCOUNTER — HOSPITAL ENCOUNTER (EMERGENCY)
Facility: MEDICAL CENTER | Age: 62
End: 2022-05-15
Attending: EMERGENCY MEDICINE
Payer: COMMERCIAL

## 2022-05-15 ENCOUNTER — APPOINTMENT (OUTPATIENT)
Dept: RADIOLOGY | Facility: MEDICAL CENTER | Age: 62
End: 2022-05-15
Attending: EMERGENCY MEDICINE
Payer: COMMERCIAL

## 2022-05-15 VITALS
SYSTOLIC BLOOD PRESSURE: 165 MMHG | RESPIRATION RATE: 20 BRPM | TEMPERATURE: 97.8 F | HEIGHT: 75 IN | BODY MASS INDEX: 25.71 KG/M2 | WEIGHT: 206.79 LBS | DIASTOLIC BLOOD PRESSURE: 90 MMHG | OXYGEN SATURATION: 97 % | HEART RATE: 53 BPM

## 2022-05-15 DIAGNOSIS — R07.9 CHEST PAIN, UNSPECIFIED TYPE: ICD-10-CM

## 2022-05-15 DIAGNOSIS — R20.2 ARM PARESTHESIA, RIGHT: ICD-10-CM

## 2022-05-15 LAB
ALBUMIN SERPL BCP-MCNC: 4.1 G/DL (ref 3.2–4.9)
ALBUMIN/GLOB SERPL: 1.5 G/DL
ALP SERPL-CCNC: 64 U/L (ref 30–99)
ALT SERPL-CCNC: 14 U/L (ref 2–50)
ANION GAP SERPL CALC-SCNC: 15 MMOL/L (ref 7–16)
AST SERPL-CCNC: 21 U/L (ref 12–45)
BASOPHILS # BLD AUTO: 0.7 % (ref 0–1.8)
BASOPHILS # BLD: 0.05 K/UL (ref 0–0.12)
BILIRUB SERPL-MCNC: 1.1 MG/DL (ref 0.1–1.5)
BUN SERPL-MCNC: 20 MG/DL (ref 8–22)
CALCIUM SERPL-MCNC: 9 MG/DL (ref 8.4–10.2)
CHLORIDE SERPL-SCNC: 103 MMOL/L (ref 96–112)
CO2 SERPL-SCNC: 18 MMOL/L (ref 20–33)
CREAT SERPL-MCNC: 1.27 MG/DL (ref 0.5–1.4)
EKG IMPRESSION: NORMAL
EOSINOPHIL # BLD AUTO: 0.05 K/UL (ref 0–0.51)
EOSINOPHIL NFR BLD: 0.7 % (ref 0–6.9)
ERYTHROCYTE [DISTWIDTH] IN BLOOD BY AUTOMATED COUNT: 44.5 FL (ref 35.9–50)
GFR SERPLBLD CREATININE-BSD FMLA CKD-EPI: 64 ML/MIN/1.73 M 2
GLOBULIN SER CALC-MCNC: 2.8 G/DL (ref 1.9–3.5)
GLUCOSE SERPL-MCNC: 95 MG/DL (ref 65–99)
HCT VFR BLD AUTO: 39.4 % (ref 42–52)
HGB BLD-MCNC: 13.4 G/DL (ref 14–18)
IMM GRANULOCYTES # BLD AUTO: 0.02 K/UL (ref 0–0.11)
IMM GRANULOCYTES NFR BLD AUTO: 0.3 % (ref 0–0.9)
LYMPHOCYTES # BLD AUTO: 1.63 K/UL (ref 1–4.8)
LYMPHOCYTES NFR BLD: 24.4 % (ref 22–41)
MCH RBC QN AUTO: 31.8 PG (ref 27–33)
MCHC RBC AUTO-ENTMCNC: 34 G/DL (ref 33.7–35.3)
MCV RBC AUTO: 93.4 FL (ref 81.4–97.8)
MONOCYTES # BLD AUTO: 0.61 K/UL (ref 0–0.85)
MONOCYTES NFR BLD AUTO: 9.1 % (ref 0–13.4)
NEUTROPHILS # BLD AUTO: 4.32 K/UL (ref 1.82–7.42)
NEUTROPHILS NFR BLD: 64.8 % (ref 44–72)
NRBC # BLD AUTO: 0 K/UL
NRBC BLD-RTO: 0 /100 WBC
PLATELET # BLD AUTO: 265 K/UL (ref 164–446)
PMV BLD AUTO: 8.9 FL (ref 9–12.9)
POTASSIUM SERPL-SCNC: 4 MMOL/L (ref 3.6–5.5)
PROT SERPL-MCNC: 6.9 G/DL (ref 6–8.2)
RBC # BLD AUTO: 4.22 M/UL (ref 4.7–6.1)
SODIUM SERPL-SCNC: 136 MMOL/L (ref 135–145)
TROPONIN T SERPL-MCNC: <6 NG/L (ref 6–19)
WBC # BLD AUTO: 6.7 K/UL (ref 4.8–10.8)

## 2022-05-15 PROCEDURE — 700102 HCHG RX REV CODE 250 W/ 637 OVERRIDE(OP): Performed by: EMERGENCY MEDICINE

## 2022-05-15 PROCEDURE — 93005 ELECTROCARDIOGRAM TRACING: CPT | Performed by: EMERGENCY MEDICINE

## 2022-05-15 PROCEDURE — A9270 NON-COVERED ITEM OR SERVICE: HCPCS | Performed by: EMERGENCY MEDICINE

## 2022-05-15 PROCEDURE — 36415 COLL VENOUS BLD VENIPUNCTURE: CPT

## 2022-05-15 PROCEDURE — 85025 COMPLETE CBC W/AUTO DIFF WBC: CPT

## 2022-05-15 PROCEDURE — 99285 EMERGENCY DEPT VISIT HI MDM: CPT

## 2022-05-15 PROCEDURE — 84484 ASSAY OF TROPONIN QUANT: CPT

## 2022-05-15 PROCEDURE — 94760 N-INVAS EAR/PLS OXIMETRY 1: CPT

## 2022-05-15 PROCEDURE — 71045 X-RAY EXAM CHEST 1 VIEW: CPT

## 2022-05-15 PROCEDURE — 80053 COMPREHEN METABOLIC PANEL: CPT

## 2022-05-15 RX ORDER — ASPIRIN 325 MG
325 TABLET ORAL ONCE
Status: COMPLETED | OUTPATIENT
Start: 2022-05-15 | End: 2022-05-15

## 2022-05-15 RX ORDER — METHOCARBAMOL 500 MG/1
1500 TABLET, FILM COATED ORAL ONCE
Status: COMPLETED | OUTPATIENT
Start: 2022-05-15 | End: 2022-05-15

## 2022-05-15 RX ADMIN — ASPIRIN 325 MG ORAL TABLET 325 MG: 325 PILL ORAL at 20:13

## 2022-05-15 RX ADMIN — METHOCARBAMOL 1500 MG: 500 TABLET ORAL at 20:13

## 2022-05-15 ASSESSMENT — ENCOUNTER SYMPTOMS
VOMITING: 0
FEVER: 0
COUGH: 0
CHILLS: 0
SHORTNESS OF BREATH: 0
NAUSEA: 0
TINGLING: 1

## 2022-05-15 ASSESSMENT — HEART SCORE
HISTORY: SLIGHTLY SUSPICIOUS
AGE: 45-64
ECG: NORMAL
HEART SCORE: 2
RISK FACTORS: 1-2 RISK FACTORS
TROPONIN: LESS THAN OR EQUAL TO NORMAL LIMIT

## 2022-05-15 ASSESSMENT — FIBROSIS 4 INDEX: FIB4 SCORE: 0.93

## 2022-05-16 NOTE — ED PROVIDER NOTES
ED Provider Note     5/15/2022  7:52 PM    Means of Arrival: Walk In  History obtained by: patient  Limitations: None  PCP: Demetra Hood M.D.  CODE STATUS: Full    CHIEF COMPLAINT  Chief Complaint   Patient presents with   • Chest Pain       HPI  Thomas Huber is a 62 y.o. male who has a history of stroke, hypertension, PFO presents for evaluation of mild right chest pain onset five days ago. He states that he has an intermittent dull ache on his upper right chest over the past five days. The sensation in his chest is exacerbated when he turns his head side to side. He has associated tingling in his right hand and feels weaker than before but has not limited his ability to do anything. His wife states he has had decrease in energy over the past few days. He denies shortness of breath, leg swelling, diaphoresis, cough, nausea, vomiting, fever or chills. He denies any use of pain medication. No alleviating factors were reported. He has a history of a stroke (10 years ago) and PFO. His carotid on his right side is 30% blocked. He quit smoking about 20 years ago. He has a family history of MI. He has hypertension and hypothyroidism. He takes losartan, levothyroxine, and a baby aspirin daily.     REVIEW OF SYSTEMS  Review of Systems   Constitutional: Negative for chills and fever.   Respiratory: Negative for cough and shortness of breath.    Cardiovascular: Positive for chest pain (described as a dull ache). Negative for leg swelling.   Gastrointestinal: Negative for nausea and vomiting.   Neurological: Positive for tingling (in right hand). Focal weakness: states weakness along right arm.   All other systems reviewed and are negative.    See HPI for further details.    PAST MEDICAL HISTORY   has a past medical history of Hypertension, Stroke (HCC), and Thyroid disease.    SOCIAL HISTORY  Social History     Tobacco Use   • Smoking status: Former Smoker     Packs/day: 0.00     Years: 13.00      "Pack years: 0.00     Types: Cigarettes     Quit date: 1996     Years since quittin.7   • Smokeless tobacco: Never Used   • Tobacco comment: Light social smoker; 1 pack/mo   Vaping Use   • Vaping Use: Never used   Substance and Sexual Activity   • Alcohol use: Yes     Alcohol/week: 1.2 - 1.8 oz     Types: 2 - 3 Cans of beer per week     Comment: Depends on the day for type    • Drug use: Not Currently   • Sexual activity: Yes     Partners: Female       SURGICAL HISTORY   has a past surgical history that includes tonsillectomy and other.    CURRENT MEDICATIONS  Home Medications    **Home medications have not yet been reviewed for this encounter**         ALLERGIES  No Known Allergies    PHYSICAL EXAM  VITAL SIGNS: BP (!) 166/105   Pulse (!) 52   Temp 36.6 °C (97.9 °F) (Temporal)   Resp 18   Ht 1.905 m (6' 3\")   Wt 93.8 kg (206 lb 12.7 oz)   SpO2 98%   BMI 25.85 kg/m²     Constitutional: Alert in no apparent distress.  HENT: No signs of trauma, Bilateral external ears normal, Nose normal.   Eyes: Pupils are equal, Conjunctiva normal, Non-icteric.   Neck: Normal range of motion, No tenderness, Supple, No stridor. No JVD  Cardiovascular: Regular rate and rhythm, no murmurs. Symmetric distal pulses. No cyanosis of extremities. No peripheral edema of extremities.  Thorax & Lungs: Normal breath sounds, No respiratory distress, No wheezing, No chest tenderness.   Abdomen: Soft, No tenderness, No masses, No pulsatile masses. No peritoneal signs.  Skin: Warm, Dry, No erythema, No rash.   Back: No midline bony tenderness, No CVA tenderness.   Musculoskeletal: Good range of motion in all major joints. No tenderness to palpation or major deformities noted. Tenderness at right trapezius.   Neurologic: Alert and oriented to person place time and situation, no facial droop, clear speech, no aphasia, no ataxia, No pronator drift, Strength 5/5 (unable to tell difference in strength in both upper extremities),  Normal " motor function, Normal sensory function  Psychiatric: Affect normal, Judgment normal, Mood normal.   Physical Exam     DIAGNOSTIC STUDIES / PROCEDURES    EKG  Results for orders placed or performed during the hospital encounter of 05/15/22   EKG   Result Value Ref Range    Report       [facility] Emergency Dept.    Test Date:  2022-05-15  Pt Name:    CIARA HAYDEN           Department: [facility]  MRN:        [id number]                      Room:  Gender:     Male                         Technician: deja  :        1960                   Requested By:SABRINA RODRIGUEZ  Order #:    [id number]                    Reading MD: Sarmad Washington II, MD    Measurements  Intervals                                Axis  Rate:       53                           P:          73  KS:         195                          QRS:        3  QRSD:       107                          T:          50  QT:         466  QTc:        438    Interpretive Statements  Sinus rhythm  Rate 53  Normal intervals.  No ST elevation or depression. Normal twaves.  IMpression: Normal sinus rhythm EKG  No previous ECG available for comparison  Electronically Signed On 5- 19:48:47 PDT by Sarmad Washington II, MD       LABS  Pertinent Labs & Imaging studies reviewed. (See chart for details)  Labs Reviewed   CBC WITH DIFFERENTIAL - Abnormal; Notable for the following components:       Result Value    RBC 4.22 (*)     Hemoglobin 13.4 (*)     Hematocrit 39.4 (*)     MPV 8.9 (*)     All other components within normal limits   COMP METABOLIC PANEL - Abnormal; Notable for the following components:    Co2 18 (*)     All other components within normal limits   TROPONIN   ESTIMATED GFR     RADIOLOGY  Pertinent Labs & Imaging studies reviewed. (See chart for details)  DX-CHEST-PORTABLE (1 VIEW)   Final Result         1.  No acute cardiopulmonary disease.        COURSE & MEDICAL DECISION MAKING  Pertinent Labs & Imaging studies  reviewed. (See chart for details)    7:52 PM This is an emergent evaluation of a  62 y.o. male who presents with a dull ache in his upper right chest. Differential diagnoses include but not limited to: radiculopathy, MI, musculoskeletal strain. Ordered EKG, CXR, cbc, cmp, troponin to evaluate. EKG is normal. Patient will be treated with Robaxin 1,500 mg tablet and aspirin 325 mg tablet for his pain.    8:52 PM DX-chest shows no cardiopulmonary abnormalities. He has a slightly decrease hemoglobin of 13.4, hematocrit of 39.4. Bicarb 18 (normal AG).  All other labs are within acceptable limits. Troponin is undetectable. Patient was reevaluated at bedside. Discussed lab and radiology results with the patient. Blood pressure was check in both arms and both of them were 165/90. No concerns for aortic disease causing discomfort. Based on description of symptoms, likely radiculopathy. I recommend that he has a close follow up appointment with his primary care provider for re-evaluation.     I have ordered continuous pulse ox and cardiac monitoring. Pulse ox shows a normal wave form with normal saturations >95%. Cardiac monitors show a normal sinus rhythm without ectopy.     HEART SCORE: 2    The patient will return for worsening symptoms and is stable at the time of discharge. The patient verbalizes understanding. Guidance was provided on appropriate use of medications including driving under the influence, overdose, and side effects.     DISPOSITION:  Patient will be discharged home in stable condition.    FOLLOW UP:  Demetra Hood M.D.  [address] [facility] [address]  [facility] [address] [phone]    Schedule an appointment as soon as possible for a visit       [facility], Emergency Dept  [address]  [facility] [id number] [phone]    severe worsening pain, worsening weakness in arm, fever or other serious concerns       FINAL IMPRESSION    ICD-10-CM   1. Chest pain,  unspecified type  R07.9   2. Arm paresthesia, right  R20.2        This dictation was created using voice recognition software. The accuracy of the dictation is limited to the abilities of the software. I expect there may be some errors of grammar and possibly content. The nursing notes were reviewed and certain aspects of this information were incorporated into this note.     Electronically signed by: Sarmad Washington MD,  5/15/2022

## 2022-07-22 ENCOUNTER — APPOINTMENT (RX ONLY)
Dept: URBAN - METROPOLITAN AREA CLINIC 35 | Facility: CLINIC | Age: 62
Setting detail: DERMATOLOGY
End: 2022-07-22

## 2022-07-22 DIAGNOSIS — L82.1 OTHER SEBORRHEIC KERATOSIS: ICD-10-CM

## 2022-07-22 DIAGNOSIS — L73.8 OTHER SPECIFIED FOLLICULAR DISORDERS: ICD-10-CM

## 2022-07-22 DIAGNOSIS — L81.4 OTHER MELANIN HYPERPIGMENTATION: ICD-10-CM

## 2022-07-22 DIAGNOSIS — L57.0 ACTINIC KERATOSIS: ICD-10-CM

## 2022-07-22 DIAGNOSIS — Z85.828 PERSONAL HISTORY OF OTHER MALIGNANT NEOPLASM OF SKIN: ICD-10-CM

## 2022-07-22 DIAGNOSIS — D22 MELANOCYTIC NEVI: ICD-10-CM

## 2022-07-22 PROBLEM — D48.5 NEOPLASM OF UNCERTAIN BEHAVIOR OF SKIN: Status: ACTIVE | Noted: 2022-07-22

## 2022-07-22 PROBLEM — D22.5 MELANOCYTIC NEVI OF TRUNK: Status: ACTIVE | Noted: 2022-07-22

## 2022-07-22 PROCEDURE — ? BIOPSY BY SHAVE METHOD

## 2022-07-22 PROCEDURE — 99213 OFFICE O/P EST LOW 20 MIN: CPT | Mod: 25

## 2022-07-22 PROCEDURE — 11102 TANGNTL BX SKIN SINGLE LES: CPT

## 2022-07-22 PROCEDURE — 17003 DESTRUCT PREMALG LES 2-14: CPT

## 2022-07-22 PROCEDURE — ? COUNSELING

## 2022-07-22 PROCEDURE — ? LIQUID NITROGEN

## 2022-07-22 PROCEDURE — 17000 DESTRUCT PREMALG LESION: CPT | Mod: 59

## 2022-07-22 PROCEDURE — 11103 TANGNTL BX SKIN EA SEP/ADDL: CPT

## 2022-07-22 ASSESSMENT — LOCATION DETAILED DESCRIPTION DERM
LOCATION DETAILED: RIGHT SUPERIOR FOREHEAD
LOCATION DETAILED: INFERIOR THORACIC SPINE
LOCATION DETAILED: RIGHT SUPERIOR CENTRAL MALAR CHEEK
LOCATION DETAILED: LEFT MID-UPPER BACK
LOCATION DETAILED: RIGHT MEDIAL FOREHEAD
LOCATION DETAILED: RIGHT FOREHEAD
LOCATION DETAILED: LEFT SUPERIOR CENTRAL MALAR CHEEK
LOCATION DETAILED: RIGHT VENTRAL PROXIMAL FOREARM
LOCATION DETAILED: INFERIOR MID FOREHEAD
LOCATION DETAILED: LEFT VENTRAL PROXIMAL FOREARM
LOCATION DETAILED: LEFT DISTAL DORSAL FOREARM
LOCATION DETAILED: SUPERIOR THORACIC SPINE
LOCATION DETAILED: RIGHT LATERAL FOREHEAD

## 2022-07-22 ASSESSMENT — LOCATION ZONE DERM
LOCATION ZONE: ARM
LOCATION ZONE: TRUNK
LOCATION ZONE: FACE

## 2022-07-22 ASSESSMENT — LOCATION SIMPLE DESCRIPTION DERM
LOCATION SIMPLE: RIGHT CHEEK
LOCATION SIMPLE: RIGHT FOREARM
LOCATION SIMPLE: RIGHT FOREHEAD
LOCATION SIMPLE: UPPER BACK
LOCATION SIMPLE: LEFT CHEEK
LOCATION SIMPLE: LEFT UPPER BACK
LOCATION SIMPLE: LEFT FOREARM
LOCATION SIMPLE: INFERIOR FOREHEAD

## 2022-07-22 NOTE — PROCEDURE: BIOPSY BY SHAVE METHOD
Detail Level: Detailed
Depth Of Biopsy: dermis
Was A Bandage Applied: Yes
Size Of Lesion In Cm: 0.5
X Size Of Lesion In Cm: 0
Biopsy Type: H and E
Biopsy Method: Personna blade
Anesthesia Type: 1% lidocaine with 1:100,000 epinephrine and a 1:10 solution of 8.4% sodium bicarbonate
Anesthesia Volume In Cc: 1
Hemostasis: Aluminum Chloride
Wound Care: Petrolatum
Dressing: pressure dressing with telfa
Destruction After The Procedure: No
Type Of Destruction Used: Curettage
Curettage Text: The wound bed was treated with curettage after the biopsy was performed.
Cryotherapy Text: The wound bed was treated with cryotherapy after the biopsy was performed.
Electrodesiccation Text: The wound bed was treated with electrodesiccation after the biopsy was performed.
Electrodesiccation And Curettage Text: The wound bed was treated with electrodesiccation and curettage after the biopsy was performed.
Silver Nitrate Text: The wound bed was treated with silver nitrate after the biopsy was performed.
Lab: [id number]
Lab Facility: 
Consent: Written consent was obtained and risks were reviewed including but not limited to scarring, infection, bleeding, scabbing, incomplete removal, nerve damage and allergy to anesthesia.
Post-Care Instructions: I reviewed with the patient in detail post-care instructions. Patient is to keep the biopsy site dry overnight. Gentle cleansing daily.  Apply petroleum ointment daily until healed. Patient may apply hydrogen peroxide soaks to remove any crusting.
Notification Instructions: Patient will be notified of biopsy results. However, patient instructed to call the office if not contacted within 2 weeks.
Billing Type: Third-Party Bill
Information: Selecting Yes will display possible errors in your note based on the variables you have selected. This validation is only offered as a suggestion for you. PLEASE NOTE THAT THE VALIDATION TEXT WILL BE REMOVED WHEN YOU FINALIZE YOUR NOTE. IF YOU WANT TO FAX A PRELIMINARY NOTE YOU WILL NEED TO TOGGLE THIS TO 'NO' IF YOU DO NOT WANT IT IN YOUR FAXED NOTE.
Size Of Lesion In Cm: 0.9

## 2022-07-22 NOTE — PROCEDURE: LIQUID NITROGEN
Render Note In Bullet Format When Appropriate: No
Show Applicator Variable?: Yes
Post-Care Instructions: I reviewed with the patient in detail post-care instructions. Patient is to wear sunprotection, and avoid picking at any of the treated lesions. Pt may apply Vaseline to crusted or scabbing areas.
Duration Of Freeze Thaw-Cycle (Seconds): 0
Detail Level: Detailed
Number Of Freeze-Thaw Cycles: 1 freeze-thaw cycle
Consent: The patient's consent was obtained including but not limited to risks of crusting, scabbing, blistering, scarring, darker or lighter pigmentary change, recurrence, incomplete removal and infection.

## 2022-09-08 ENCOUNTER — TELEPHONE (OUTPATIENT)
Dept: MEDICAL GROUP | Facility: LAB | Age: 62
End: 2022-09-08
Payer: COMMERCIAL

## 2022-09-08 DIAGNOSIS — Z71.84 TRAVEL ADVICE ENCOUNTER: ICD-10-CM

## 2022-09-08 RX ORDER — AZITHROMYCIN 500 MG/1
500 TABLET, FILM COATED ORAL DAILY
Qty: 5 TABLET | Refills: 0 | Status: SHIPPED | OUTPATIENT
Start: 2022-09-08 | End: 2022-09-13

## 2022-09-08 RX ORDER — ATOVAQUONE AND PROGUANIL HYDROCHLORIDE 250; 100 MG/1; MG/1
1 TABLET, FILM COATED ORAL DAILY
Qty: 35 TABLET | Refills: 0 | Status: SHIPPED | OUTPATIENT
Start: 2022-09-08 | End: 2023-09-11

## 2022-09-08 RX ORDER — CIPROFLOXACIN 500 MG/1
250 TABLET, FILM COATED ORAL 2 TIMES DAILY
Qty: 10 TABLET | Refills: 0 | Status: SHIPPED
Start: 2022-09-08 | End: 2022-09-08

## 2022-09-08 RX ORDER — CIPROFLOXACIN 500 MG/1
500 TABLET, FILM COATED ORAL 2 TIMES DAILY
Qty: 10 TABLET | Refills: 0 | Status: SHIPPED | OUTPATIENT
Start: 2022-09-08 | End: 2022-09-13

## 2022-09-08 NOTE — TELEPHONE ENCOUNTER
ciprofloxacin (CIPRO)  Please send in a new RX with correct dosage. Do you want pt on 0.5 or 1 tab.  Take 0.5 Tablets by mouth 2 times a day for 5 days( 10 TABS)   They have 250 MG and 500 MG in stock.

## 2022-09-20 DIAGNOSIS — Z71.84 TRAVEL ADVICE ENCOUNTER: ICD-10-CM

## 2022-09-30 ENCOUNTER — APPOINTMENT (RX ONLY)
Dept: URBAN - METROPOLITAN AREA CLINIC 35 | Facility: CLINIC | Age: 62
Setting detail: DERMATOLOGY
End: 2022-09-30

## 2022-09-30 PROBLEM — C44.712 BASAL CELL CARCINOMA OF SKIN OF RIGHT LOWER LIMB, INCLUDING HIP: Status: ACTIVE | Noted: 2022-09-30

## 2022-09-30 PROBLEM — C44.519 BASAL CELL CARCINOMA OF SKIN OF OTHER PART OF TRUNK: Status: ACTIVE | Noted: 2022-09-30

## 2022-09-30 PROCEDURE — 17261 DSTRJ MAL LES T/A/L .6-1.0CM: CPT

## 2022-09-30 PROCEDURE — ? CURETTAGE AND DESTRUCTION

## 2022-09-30 PROCEDURE — 17262 DSTRJ MAL LES T/A/L 1.1-2.0: CPT

## 2022-09-30 NOTE — PROCEDURE: CURETTAGE AND DESTRUCTION
Detail Level: Detailed
Biopsy Photograph Reviewed: Yes
Accession # (Optional): [id number]
Number Of Curettages: 3
Size Of Lesion In Cm: 0.8
Size Of Lesion After Curettage: 1
Add Intralesional Injection: No
Anesthesia Type: 2% lidocaine without epinephrine
Cautery Type: electrodesiccation
What Was Performed First?: Curettage
Final Size Statement: The size of the lesion after curettage was
Additional Information: (Optional): The wound was cleaned, and a pressure dressing was applied.  The patient received detailed post-op instructions.
Consent was obtained from the patient. The risks, benefits and alternatives to therapy were discussed in detail. Specifically, the risks of infection, scarring, bleeding, prolonged wound healing, nerve injury, incomplete removal, allergy to anesthesia and recurrence were addressed. Alternatives to ED&C, such as: surgical removal and XRT were also discussed.  Prior to the procedure, the treatment site was clearly identified and confirmed by the patient. All components of Universal Protocol/PAUSE Rule completed.
Post-Care Instructions: I reviewed with the patient in detail post-care instructions. Patient is to keep the area dry for 48 hours, and not to engage in any swimming until the area is healed. Should the patient develop any fevers, chills, bleeding, severe pain patient will contact the office immediately.
Bill As A Line Item Or As Units: Line Item
Accession # (Optional): [id number]
Size Of Lesion In Cm: 1.1
Size Of Lesion After Curettage: 1.4

## 2022-10-05 DIAGNOSIS — J32.4 CHRONIC PANSINUSITIS: ICD-10-CM

## 2022-10-05 DIAGNOSIS — J30.2 SEASONAL ALLERGIES: ICD-10-CM

## 2022-10-06 ENCOUNTER — TELEPHONE (OUTPATIENT)
Dept: MEDICAL GROUP | Facility: LAB | Age: 62
End: 2022-10-06
Payer: COMMERCIAL

## 2022-10-06 RX ORDER — MONTELUKAST SODIUM 10 MG/1
TABLET ORAL
Qty: 90 TABLET | Refills: 0 | Status: SHIPPED | OUTPATIENT
Start: 2022-10-06 | End: 2023-01-03

## 2022-10-06 NOTE — TELEPHONE ENCOUNTER
Pt lvm stating bill for labs was expensive was told by [facility] to call and see if we can resubmit coding to lower price  labs were in March

## 2022-12-31 DIAGNOSIS — J30.2 SEASONAL ALLERGIES: ICD-10-CM

## 2022-12-31 DIAGNOSIS — J32.4 CHRONIC PANSINUSITIS: ICD-10-CM

## 2023-01-03 RX ORDER — MONTELUKAST SODIUM 10 MG/1
TABLET ORAL
Qty: 90 TABLET | Refills: 0 | Status: SHIPPED | OUTPATIENT
Start: 2023-01-03 | End: 2023-04-13

## 2023-01-21 DIAGNOSIS — N52.8 OTHER MALE ERECTILE DYSFUNCTION: ICD-10-CM

## 2023-01-23 RX ORDER — TADALAFIL 10 MG/1
10 TABLET ORAL PRN
Qty: 10 TABLET | Refills: 11 | Status: SHIPPED | OUTPATIENT
Start: 2023-01-23 | End: 2023-01-24 | Stop reason: SDUPTHER

## 2023-01-24 DIAGNOSIS — N52.8 OTHER MALE ERECTILE DYSFUNCTION: ICD-10-CM

## 2023-01-24 RX ORDER — TADALAFIL 10 MG/1
10 TABLET ORAL PRN
Qty: 10 TABLET | Refills: 11 | Status: SHIPPED
Start: 2023-01-24 | End: 2023-09-11 | Stop reason: SDUPTHER

## 2023-03-20 ENCOUNTER — PATIENT MESSAGE (OUTPATIENT)
Dept: MEDICAL GROUP | Facility: LAB | Age: 63
End: 2023-03-20
Payer: COMMERCIAL

## 2023-03-20 DIAGNOSIS — Z12.83 SKIN CANCER SCREENING: ICD-10-CM

## 2023-03-31 ENCOUNTER — APPOINTMENT (RX ONLY)
Dept: URBAN - METROPOLITAN AREA CLINIC 35 | Facility: CLINIC | Age: 63
Setting detail: DERMATOLOGY
End: 2023-03-31

## 2023-03-31 DIAGNOSIS — L73.8 OTHER SPECIFIED FOLLICULAR DISORDERS: ICD-10-CM

## 2023-03-31 DIAGNOSIS — L57.0 ACTINIC KERATOSIS: ICD-10-CM

## 2023-03-31 DIAGNOSIS — D22 MELANOCYTIC NEVI: ICD-10-CM

## 2023-03-31 DIAGNOSIS — L82.1 OTHER SEBORRHEIC KERATOSIS: ICD-10-CM

## 2023-03-31 DIAGNOSIS — Z85.828 PERSONAL HISTORY OF OTHER MALIGNANT NEOPLASM OF SKIN: ICD-10-CM

## 2023-03-31 PROBLEM — D22.5 MELANOCYTIC NEVI OF TRUNK: Status: ACTIVE | Noted: 2023-03-31

## 2023-03-31 PROCEDURE — 17000 DESTRUCT PREMALG LESION: CPT

## 2023-03-31 PROCEDURE — ? LIQUID NITROGEN

## 2023-03-31 PROCEDURE — 99213 OFFICE O/P EST LOW 20 MIN: CPT | Mod: 25

## 2023-03-31 PROCEDURE — ? COUNSELING

## 2023-03-31 PROCEDURE — 17003 DESTRUCT PREMALG LES 2-14: CPT

## 2023-03-31 ASSESSMENT — LOCATION ZONE DERM
LOCATION ZONE: TRUNK
LOCATION ZONE: ARM
LOCATION ZONE: FACE
LOCATION ZONE: LEG
LOCATION ZONE: NECK

## 2023-03-31 ASSESSMENT — LOCATION DETAILED DESCRIPTION DERM
LOCATION DETAILED: RIGHT DISTAL PRETIBIAL REGION
LOCATION DETAILED: INFERIOR MID FOREHEAD
LOCATION DETAILED: LEFT LATERAL PROXIMAL PRETIBIAL REGION
LOCATION DETAILED: LEFT MID-UPPER BACK
LOCATION DETAILED: RIGHT SUPERIOR CENTRAL MALAR CHEEK
LOCATION DETAILED: INFERIOR THORACIC SPINE
LOCATION DETAILED: LEFT SUPERIOR CENTRAL MALAR CHEEK
LOCATION DETAILED: RIGHT PROXIMAL DORSAL FOREARM
LOCATION DETAILED: LEFT SUPERIOR LATERAL NECK
LOCATION DETAILED: LEFT MEDIAL INFERIOR CHEST
LOCATION DETAILED: RIGHT SUPERIOR FOREHEAD
LOCATION DETAILED: SUPERIOR THORACIC SPINE
LOCATION DETAILED: LEFT PROXIMAL PRETIBIAL REGION
LOCATION DETAILED: RIGHT INFERIOR LATERAL NECK

## 2023-03-31 ASSESSMENT — LOCATION SIMPLE DESCRIPTION DERM
LOCATION SIMPLE: RIGHT FOREHEAD
LOCATION SIMPLE: INFERIOR FOREHEAD
LOCATION SIMPLE: LEFT UPPER BACK
LOCATION SIMPLE: NECK
LOCATION SIMPLE: RIGHT ANTERIOR NECK
LOCATION SIMPLE: LEFT CHEEK
LOCATION SIMPLE: LEFT PRETIBIAL REGION
LOCATION SIMPLE: RIGHT FOREARM
LOCATION SIMPLE: RIGHT CHEEK
LOCATION SIMPLE: CHEST
LOCATION SIMPLE: RIGHT PRETIBIAL REGION
LOCATION SIMPLE: UPPER BACK

## 2023-04-12 DIAGNOSIS — J32.4 CHRONIC PANSINUSITIS: ICD-10-CM

## 2023-04-12 DIAGNOSIS — J30.2 SEASONAL ALLERGIES: ICD-10-CM

## 2023-04-13 RX ORDER — LEVOTHYROXINE SODIUM 0.03 MG/1
25 TABLET ORAL
Qty: 90 TABLET | Refills: 0 | Status: SHIPPED | OUTPATIENT
Start: 2023-04-13 | End: 2023-06-13

## 2023-04-13 RX ORDER — LOSARTAN POTASSIUM 50 MG/1
TABLET ORAL
Qty: 135 TABLET | Refills: 0 | Status: SHIPPED | OUTPATIENT
Start: 2023-04-13 | End: 2023-08-02 | Stop reason: SDUPTHER

## 2023-04-13 RX ORDER — MONTELUKAST SODIUM 10 MG/1
TABLET ORAL
Qty: 90 TABLET | Refills: 0 | Status: SHIPPED | OUTPATIENT
Start: 2023-04-13 | End: 2023-08-02 | Stop reason: SDUPTHER

## 2023-04-13 NOTE — TELEPHONE ENCOUNTER
Received request via: Pharmacy    Was the patient seen in the last year in this department? No  3/7/22  Does the patient have an active prescription (recently filled or refills available) for medication(s) requested? No    Does the patient have [facility] Plus and need 100 day supply (blood pressure, diabetes and cholesterol meds only)? Medication is not for cholesterol, blood pressure or diabetes

## 2023-05-10 NOTE — PATIENT COMMUNICATION
[address] ENCOUNTER        Pt Name: Rogerio Boyd  MRN: [id number]  [place] 1971  Date of evaluation: 5/10/2023  Provider: JERSEY Hansen - CNP  PCP: Brody Fields MD  Note Started: 10:37 AM EDT 5/10/23    CHIEF COMPLAINT       Chief Complaint   Patient presents with    Pharyngitis     Sore throat since monday       HISTORY OF PRESENT ILLNESS: 1 or more Elements   History From: patient    Limitations to history : Niyah Gilbert is a 49 y.o. female who presents for evaluation. She has had a sore  throat since Monday. She denies fever, congestion, ear pain, or exposure to strep    Nursing Notes were all reviewed and agreed with or any disagreements were addressed in the HPI. REVIEW OF SYSTEMS :      Review of Systems    Positives and Pertinent negatives as per HPI. SURGICAL HISTORY     Past Surgical History:   Procedure Laterality Date    CHOLECYSTECTOMY      HYSTERECTOMY, TOTAL ABDOMINAL  2016    OTHER SURGICAL HISTORY      removal of pre cancerous cells cervix    UPPER GASTROINTESTINAL ENDOSCOPY N/A 2018    EGD FOREIGN BODY REMOVAL performed by Patti Chavez DO at [address] [place] [address]  2018    EGD DILATION BALLOON performed by Patti Chavez DO at [address] [facility]       Previous Medications    ALBUTEROL (PROVENTIL HFA) 108 (90 BASE) MCG/ACT INHALER    Inhale 2 puffs into the lungs every 4 hours as needed for Wheezing (and coughing).     DEXTROMETHORPHAN-GUAIFENESIN (MUCINEX DM)  MG PER SR TABLET    Take 1 tablet by mouth every 12 hours as needed for Congestion    MECLIZINE (ANTIVERT) 25 MG TABLET    Take 1 tablet by mouth 3 times daily as needed for Dizziness or Nausea (may cause drowsiness)    PANTOPRAZOLE (PROTONIX) 40 MG TABLET    Take 1 tablet by mouth 2 times daily (before meals)       ALLERGIES     Penicillins and Sulfa antibiotics    FAMILYHISTORY       Family Please ask the patient to schedule an appointment if he is still having symptoms despite 5 days of appropriate abx + steroid.

## 2023-08-02 DIAGNOSIS — J32.4 CHRONIC PANSINUSITIS: ICD-10-CM

## 2023-08-02 DIAGNOSIS — J30.2 SEASONAL ALLERGIES: ICD-10-CM

## 2023-08-02 RX ORDER — LOSARTAN POTASSIUM 50 MG/1
75 TABLET ORAL
Qty: 135 TABLET | Refills: 0 | Status: SHIPPED
Start: 2023-08-02 | End: 2023-09-11

## 2023-08-02 RX ORDER — LEVOTHYROXINE SODIUM 0.03 MG/1
25 TABLET ORAL
Qty: 90 TABLET | Refills: 3 | Status: SHIPPED
Start: 2023-08-02 | End: 2023-09-11

## 2023-08-02 RX ORDER — MONTELUKAST SODIUM 10 MG/1
10 TABLET ORAL DAILY
Qty: 90 TABLET | Refills: 0 | Status: SHIPPED
Start: 2023-08-02 | End: 2023-09-11

## 2023-08-02 RX ORDER — LEVOTHYROXINE SODIUM 0.03 MG/1
TABLET ORAL
Qty: 90 TABLET | Refills: 0 | Status: SHIPPED | OUTPATIENT
Start: 2023-08-02 | End: 2023-10-23 | Stop reason: SDUPTHER

## 2023-08-02 RX ORDER — LOSARTAN POTASSIUM 50 MG/1
TABLET ORAL
Qty: 135 TABLET | Refills: 0 | Status: SHIPPED | OUTPATIENT
Start: 2023-08-02 | End: 2023-09-11 | Stop reason: SDUPTHER

## 2023-08-02 NOTE — TELEPHONE ENCOUNTER
Received request via: Patient    Was the patient seen in the last year in this department? No  LOV: 3/7/2022  Does the patient have an active prescription (recently filled or refills available) for medication(s) requested? No    Does the patient have [facility] and need 100 day supply (blood pressure, diabetes and cholesterol meds only)? Patient does not have [facility]

## 2023-08-21 ENCOUNTER — TELEPHONE (OUTPATIENT)
Dept: MEDICAL GROUP | Facility: LAB | Age: 63
End: 2023-08-21
Payer: COMMERCIAL

## 2023-08-21 DIAGNOSIS — F40.248 FEAR OF PUBLIC SPEAKING: ICD-10-CM

## 2023-08-21 RX ORDER — PROPRANOLOL HYDROCHLORIDE 10 MG/1
10 TABLET ORAL 3 TIMES DAILY
Qty: 30 TABLET | Refills: 0 | Status: SHIPPED | OUTPATIENT
Start: 2023-08-21 | End: 2023-08-31

## 2023-08-21 NOTE — TELEPHONE ENCOUNTER
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

## 2023-08-21 NOTE — TELEPHONE ENCOUNTER
Will try some propranolol. Ok to take at bedtime, also 30 minutes prior to speech.   May make sleepy.

## 2023-09-11 ENCOUNTER — OFFICE VISIT (OUTPATIENT)
Dept: MEDICAL GROUP | Facility: LAB | Age: 63
End: 2023-09-11
Payer: COMMERCIAL

## 2023-09-11 VITALS
HEART RATE: 65 BPM | RESPIRATION RATE: 12 BRPM | DIASTOLIC BLOOD PRESSURE: 78 MMHG | TEMPERATURE: 97.4 F | OXYGEN SATURATION: 96 % | HEIGHT: 75 IN | SYSTOLIC BLOOD PRESSURE: 130 MMHG | BODY MASS INDEX: 24.87 KG/M2 | WEIGHT: 200 LBS

## 2023-09-11 DIAGNOSIS — F41.9 ANXIETY: ICD-10-CM

## 2023-09-11 DIAGNOSIS — Z23 NEED FOR VACCINATION: ICD-10-CM

## 2023-09-11 DIAGNOSIS — N52.8 OTHER MALE ERECTILE DYSFUNCTION: ICD-10-CM

## 2023-09-11 DIAGNOSIS — E55.9 VITAMIN D DEFICIENCY: ICD-10-CM

## 2023-09-11 DIAGNOSIS — E03.8 OTHER SPECIFIED HYPOTHYROIDISM: ICD-10-CM

## 2023-09-11 DIAGNOSIS — Z13.6 ENCOUNTER FOR SCREENING FOR CARDIOVASCULAR DISORDERS: ICD-10-CM

## 2023-09-11 DIAGNOSIS — G47.9 SLEEP DISTURBANCE: ICD-10-CM

## 2023-09-11 PROCEDURE — 3075F SYST BP GE 130 - 139MM HG: CPT | Performed by: FAMILY MEDICINE

## 2023-09-11 PROCEDURE — 99214 OFFICE O/P EST MOD 30 MIN: CPT | Mod: 25 | Performed by: FAMILY MEDICINE

## 2023-09-11 PROCEDURE — 90471 IMMUNIZATION ADMIN: CPT | Performed by: FAMILY MEDICINE

## 2023-09-11 PROCEDURE — 90686 IIV4 VACC NO PRSV 0.5 ML IM: CPT | Performed by: FAMILY MEDICINE

## 2023-09-11 PROCEDURE — 3078F DIAST BP <80 MM HG: CPT | Performed by: FAMILY MEDICINE

## 2023-09-11 RX ORDER — LOSARTAN POTASSIUM 100 MG/1
100 TABLET ORAL DAILY
Qty: 90 TABLET | Refills: 2 | Status: SHIPPED | OUTPATIENT
Start: 2023-09-11 | End: 2023-12-10

## 2023-09-11 RX ORDER — TADALAFIL 10 MG/1
10 TABLET ORAL PRN
Qty: 10 TABLET | Refills: 11 | Status: SHIPPED | OUTPATIENT
Start: 2023-09-11 | End: 2023-09-12 | Stop reason: SDUPTHER

## 2023-09-11 RX ORDER — CLONAZEPAM 0.5 MG/1
TABLET ORAL
COMMUNITY
Start: 2023-08-22 | End: 2023-09-11 | Stop reason: SDUPTHER

## 2023-09-11 RX ORDER — CLONAZEPAM 0.5 MG/1
0.25 TABLET ORAL NIGHTLY
Qty: 15 TABLET | Refills: 0 | Status: SHIPPED | OUTPATIENT
Start: 2023-09-11 | End: 2023-10-11

## 2023-09-11 ASSESSMENT — PATIENT HEALTH QUESTIONNAIRE - PHQ9
CLINICAL INTERPRETATION OF PHQ2 SCORE: 2
5. POOR APPETITE OR OVEREATING: 1 - SEVERAL DAYS
SUM OF ALL RESPONSES TO PHQ QUESTIONS 1-9: 11

## 2023-09-11 ASSESSMENT — FIBROSIS 4 INDEX: FIB4 SCORE: 1.31

## 2023-09-11 NOTE — PROGRESS NOTES
Subjective:     Chief Complaint   Patient presents with    Thyroid Follow-up     Sleeping issues          HPI:   Thomas presents today with thyroid follow up. Was seen in [facility] in  and was found to have elevated tsh and high BP. Bp at  180 systolic.   TSH 5.83, T4 1.63  In the past was diagnosed with a thyroid storm years ago when under an immense amount of stress, not sleeping, etc.     Couple weeks before the trip he was having some issues with anxiety and stress increasing before his daughters wedding. Got to a peak about 3 days prior to the wedding. Mid august. Was only getting 2-3 hours a night.     Was having some trouble sleeping with all of this, and prior to this was then sleeping too much.   H/o thyroid storm.     Has been monitoring BP at home as well. Still 140-160s a lot.     H/o sleep study when thyroid storm was diagnosed. Was on clonazepam remotely in the past which worked, but this worked again in [place].     Falls asleep well, but then wakes a lot in the night and hard to get back to sleep.   On clonazepam was able to get 6-8 hours, better.     Depression Screening    Little interest or pleasure in doing things?  1 - several days   Feeling down, depressed , or hopeless? 1 - several days   Trouble falling or staying asleep, or sleeping too much?  3 - nearly every day   Feeling tired or having little energy?  2 - more than half the days   Poor appetite or overeating?  1 - several days   Feeling bad about yourself - or that you are a failure or have let yourself or your family down? 1 - several days   Trouble concentrating on things, such as reading the newspaper or watching television? 1 - several days   Moving or speaking so slowly that other people could have noticed.  Or the opposite - being so fidgety or restless that you have been moving around a lot more than usual?  1 - several days   Thoughts that you would be better off dead, or of hurting yourself?  0 - not at all   Patient Health  "Questionnaire Score: 11       If depressive symptoms identified deferred to follow up visit unless specifically addressed in assesment and plan.    Interpretation of PHQ-9 Total Score   Score Severity   1-4 No Depression   5-9 Mild Depression   10-14 Moderate Depression   15-19 Moderately Severe Depression   20-27 Severe Depression      Current Outpatient Medications Ordered in Epic   Medication Sig Dispense Refill    losartan (COZAAR) 50 MG Tab TAKE 1&1/2 TABLETS BY MOUTH ONCE A  Tablet 0    montelukast (SINGULAIR) 10 MG Tab TAKE ONE TABLET BY MOUTH ONCE A DAY 90 Tablet 0    levothyroxine (SYNTHROID) 25 MCG Tab TAKE ONE TABLET BY MOUTH ONCE A DAY IN THE MORNINGON AN EMPTY STOMACH (APPOINTMENT NEEDED FOR REFILLS) 90 Tablet 0    tadalafil (CIALIS) 10 MG tablet Take 1 Tablet by mouth as needed for Erectile Dysfunction. 10 Tablet 11    typhoid (VIVOTIF) SR capsule Take 1 Capsule by mouth every 48 hours. 4 Capsule 0    atovaquone-proguanil (MALARONE) 250-100 MG per tablet Take 1 Tablet by mouth every day. 35 Tablet 0    VITAMIN D, CHOLECALCIFEROL, PO       Saw [place] 1000 MG Cap Take  by mouth.      aspirin 81 MG tablet Take 81 mg by mouth every day.       No current [facility]-ordered facility-administered medications on file.         ROS:  Gen: no fevers/chills, no changes in weight  Eyes: no changes in vision  ENT: no sore throat, no hearing loss, no bloody nose  Pulm: no sob, no cough  CV: no chest pain, no palpitations  GI: no nausea/vomiting, no diarrhea  : no dysuria  MSk: no myalgias  Skin: no rash  Neuro: no headaches, no numbness/tingling  Heme/Lymph: no easy bruising      Objective:     Exam:  /78 (BP Location: Right arm, Patient Position: Sitting, BP Cuff Size: Adult)   Pulse 65   Temp 36.3 °C (97.4 °F)   Resp 12   Ht 1.905 m (6' 3\")   Wt 90.7 kg (200 lb)   SpO2 96%   BMI 25.00 kg/m²  Body mass index is 25 kg/m².    Gen: Alert and oriented, No apparent distress.  Neck: Neck is supple " without lymphadenopathy.  Lungs: Normal effort, CTA bilaterally, no wheezes, rhonchi, or rales  CV: Regular rate and rhythm. No murmurs, rubs, or gallops.  Ext: No clubbing, cyanosis, edema.      Assessment & Plan:     63 y.o. male with the following -     1. Need for vaccination    - INFLUENZA VACCINE QUAD INJ (PF)    2. Other specified hypothyroidism  Discussed recheck and further eval of hypothyroidism. Discussed stability of thyroid dosing as he has been taking every other day or so for awhile.   - TSH; Future  - FREE THYROXINE; Future  - TRIIDOTHYRONINE; Future  - THYROID PEROXIDASE  (TPO) AB; Future  - ANTI-THYROID ANTIBODIES; Future    3. Sleep disturbance  Discussed sleep referral, but also staying with clonazepam for now to get better sleep in the short term. If this is very helpful especially with better mood during the day, we may try SSRI mediction for depression and anxiety coverage.   - Referral to Pulmonary and Sleep Medicine  - clonazePAM (KLONOPIN) 0.5 MG Tab; Take 0.5 Tablets by mouth every evening for 30 days.  Dispense: 15 Tablet; Refill: 0    4. Vitamin D deficiency    - VITAMIN D,25 HYDROXY (DEFICIENCY); Future    5. Encounter for screening for cardiovascular disorders    - CBC WITH DIFFERENTIAL; Future  - Comp Metabolic Panel; Future  - HEMOGLOBIN A1C; Future  - Lipid Profile; Future    6. Anxiety  Discussed sources and treatment for anxiety. Would like him to hold his singulair for now due to the some mood changes as side effects. Will continue working on sleep, and reassess thyroid. May have him referred to behavioral health in the future as needed.           No follow-ups on file.    Please note that this dictation was created using voice recognition software. I have made every reasonable attempt to correct obvious errors, but I expect that there are errors of grammar and possibly content that I did not discover before finalizing the note.

## 2023-09-12 ENCOUNTER — HOSPITAL ENCOUNTER (OUTPATIENT)
Dept: LAB | Facility: MEDICAL CENTER | Age: 63
End: 2023-09-12
Attending: FAMILY MEDICINE
Payer: COMMERCIAL

## 2023-09-12 DIAGNOSIS — Z13.6 ENCOUNTER FOR SCREENING FOR CARDIOVASCULAR DISORDERS: ICD-10-CM

## 2023-09-12 DIAGNOSIS — E55.9 VITAMIN D DEFICIENCY: ICD-10-CM

## 2023-09-12 DIAGNOSIS — E03.8 OTHER SPECIFIED HYPOTHYROIDISM: ICD-10-CM

## 2023-09-12 DIAGNOSIS — N52.8 OTHER MALE ERECTILE DYSFUNCTION: ICD-10-CM

## 2023-09-12 LAB
25(OH)D3 SERPL-MCNC: 46 NG/ML (ref 30–100)
ALBUMIN SERPL BCP-MCNC: 3.8 G/DL (ref 3.2–4.9)
ALBUMIN/GLOB SERPL: 1.4 G/DL
ALP SERPL-CCNC: 69 U/L (ref 30–99)
ALT SERPL-CCNC: 15 U/L (ref 2–50)
ANION GAP SERPL CALC-SCNC: 11 MMOL/L (ref 7–16)
AST SERPL-CCNC: 20 U/L (ref 12–45)
BASOPHILS # BLD AUTO: 1 % (ref 0–1.8)
BASOPHILS # BLD: 0.05 K/UL (ref 0–0.12)
BILIRUB SERPL-MCNC: 0.6 MG/DL (ref 0.1–1.5)
BUN SERPL-MCNC: 24 MG/DL (ref 8–22)
CALCIUM ALBUM COR SERPL-MCNC: 9.2 MG/DL (ref 8.5–10.5)
CALCIUM SERPL-MCNC: 9 MG/DL (ref 8.5–10.5)
CHLORIDE SERPL-SCNC: 105 MMOL/L (ref 96–112)
CHOLEST SERPL-MCNC: 163 MG/DL (ref 100–199)
CO2 SERPL-SCNC: 18 MMOL/L (ref 20–33)
CREAT SERPL-MCNC: 1.27 MG/DL (ref 0.5–1.4)
EOSINOPHIL # BLD AUTO: 0.09 K/UL (ref 0–0.51)
EOSINOPHIL NFR BLD: 1.7 % (ref 0–6.9)
ERYTHROCYTE [DISTWIDTH] IN BLOOD BY AUTOMATED COUNT: 44.5 FL (ref 35.9–50)
FASTING STATUS PATIENT QL REPORTED: NORMAL
GFR SERPLBLD CREATININE-BSD FMLA CKD-EPI: 64 ML/MIN/1.73 M 2
GLOBULIN SER CALC-MCNC: 2.8 G/DL (ref 1.9–3.5)
GLUCOSE SERPL-MCNC: 100 MG/DL (ref 65–99)
HCT VFR BLD AUTO: 44.7 % (ref 42–52)
HDLC SERPL-MCNC: 54 MG/DL
HGB BLD-MCNC: 15 G/DL (ref 14–18)
IMM GRANULOCYTES # BLD AUTO: 0.08 K/UL (ref 0–0.11)
IMM GRANULOCYTES NFR BLD AUTO: 1.5 % (ref 0–0.9)
LDLC SERPL CALC-MCNC: 95 MG/DL
LYMPHOCYTES # BLD AUTO: 1.26 K/UL (ref 1–4.8)
LYMPHOCYTES NFR BLD: 24.4 % (ref 22–41)
MCH RBC QN AUTO: 31.4 PG (ref 27–33)
MCHC RBC AUTO-ENTMCNC: 33.6 G/DL (ref 32.3–36.5)
MCV RBC AUTO: 93.5 FL (ref 81.4–97.8)
MONOCYTES # BLD AUTO: 0.69 K/UL (ref 0–0.85)
MONOCYTES NFR BLD AUTO: 13.3 % (ref 0–13.4)
NEUTROPHILS # BLD AUTO: 3 K/UL (ref 1.82–7.42)
NEUTROPHILS NFR BLD: 58.1 % (ref 44–72)
NRBC # BLD AUTO: 0 K/UL
NRBC BLD-RTO: 0 /100 WBC (ref 0–0.2)
PLATELET # BLD AUTO: 271 K/UL (ref 164–446)
PMV BLD AUTO: 9.4 FL (ref 9–12.9)
POTASSIUM SERPL-SCNC: 4.3 MMOL/L (ref 3.6–5.5)
PROT SERPL-MCNC: 6.6 G/DL (ref 6–8.2)
RBC # BLD AUTO: 4.78 M/UL (ref 4.7–6.1)
SODIUM SERPL-SCNC: 134 MMOL/L (ref 135–145)
T3 SERPL-MCNC: 99.6 NG/DL (ref 60–181)
T4 FREE SERPL-MCNC: 1.38 NG/DL (ref 0.93–1.7)
THYROPEROXIDASE AB SERPL-ACNC: 11 IU/ML (ref 0–9)
TRIGL SERPL-MCNC: 70 MG/DL (ref 0–149)
TSH SERPL DL<=0.005 MIU/L-ACNC: 4.13 UIU/ML (ref 0.38–5.33)
WBC # BLD AUTO: 5.2 K/UL (ref 4.8–10.8)

## 2023-09-12 PROCEDURE — 82306 VITAMIN D 25 HYDROXY: CPT

## 2023-09-12 PROCEDURE — 84439 ASSAY OF FREE THYROXINE: CPT

## 2023-09-12 PROCEDURE — 86376 MICROSOMAL ANTIBODY EACH: CPT

## 2023-09-12 PROCEDURE — 84443 ASSAY THYROID STIM HORMONE: CPT

## 2023-09-12 PROCEDURE — 36415 COLL VENOUS BLD VENIPUNCTURE: CPT

## 2023-09-12 PROCEDURE — 80053 COMPREHEN METABOLIC PANEL: CPT

## 2023-09-12 PROCEDURE — 84480 ASSAY TRIIODOTHYRONINE (T3): CPT

## 2023-09-12 PROCEDURE — 80061 LIPID PANEL: CPT

## 2023-09-12 PROCEDURE — 86800 THYROGLOBULIN ANTIBODY: CPT

## 2023-09-12 PROCEDURE — 85025 COMPLETE CBC W/AUTO DIFF WBC: CPT

## 2023-09-12 RX ORDER — TADALAFIL 10 MG/1
10 TABLET ORAL PRN
Qty: 10 TABLET | Refills: 11 | Status: SHIPPED
Start: 2023-09-12

## 2023-09-13 ENCOUNTER — HOSPITAL ENCOUNTER (OUTPATIENT)
Dept: LAB | Facility: MEDICAL CENTER | Age: 63
End: 2023-09-13
Attending: FAMILY MEDICINE
Payer: COMMERCIAL

## 2023-09-13 LAB
EST. AVERAGE GLUCOSE BLD GHB EST-MCNC: 111 MG/DL
HBA1C MFR BLD: 5.5 % (ref 4–5.6)
THYROGLOB AB SERPL-ACNC: <0.9 IU/ML (ref 0–4)
THYROPEROXIDASE AB SERPL-ACNC: 1.3 IU/ML (ref 0–9)

## 2023-09-13 PROCEDURE — 83036 HEMOGLOBIN GLYCOSYLATED A1C: CPT

## 2023-09-21 ENCOUNTER — APPOINTMENT (RX ONLY)
Dept: URBAN - METROPOLITAN AREA CLINIC 15 | Facility: CLINIC | Age: 63
Setting detail: DERMATOLOGY
End: 2023-09-21

## 2023-09-21 ENCOUNTER — TELEPHONE (OUTPATIENT)
Dept: HEALTH INFORMATION MANAGEMENT | Facility: OTHER | Age: 63
End: 2023-09-21
Payer: COMMERCIAL

## 2023-09-21 DIAGNOSIS — D22 MELANOCYTIC NEVI: ICD-10-CM

## 2023-09-21 DIAGNOSIS — L57.0 ACTINIC KERATOSIS: ICD-10-CM

## 2023-09-21 DIAGNOSIS — L82.1 OTHER SEBORRHEIC KERATOSIS: ICD-10-CM

## 2023-09-21 DIAGNOSIS — L11.1 TRANSIENT ACANTHOLYTIC DERMATOSIS [GROVER]: ICD-10-CM

## 2023-09-21 DIAGNOSIS — Z85.828 PERSONAL HISTORY OF OTHER MALIGNANT NEOPLASM OF SKIN: ICD-10-CM

## 2023-09-21 DIAGNOSIS — L73.8 OTHER SPECIFIED FOLLICULAR DISORDERS: ICD-10-CM

## 2023-09-21 PROBLEM — D22.5 MELANOCYTIC NEVI OF TRUNK: Status: ACTIVE | Noted: 2023-09-21

## 2023-09-21 PROCEDURE — ? COUNSELING

## 2023-09-21 PROCEDURE — ? LIQUID NITROGEN

## 2023-09-21 PROCEDURE — 17000 DESTRUCT PREMALG LESION: CPT

## 2023-09-21 PROCEDURE — 99213 OFFICE O/P EST LOW 20 MIN: CPT | Mod: 25

## 2023-09-21 ASSESSMENT — LOCATION DETAILED DESCRIPTION DERM
LOCATION DETAILED: RIGHT CENTRAL ZYGOMA
LOCATION DETAILED: LEFT MID-UPPER BACK
LOCATION DETAILED: RIGHT INFERIOR LATERAL NECK
LOCATION DETAILED: LEFT PROXIMAL PRETIBIAL REGION
LOCATION DETAILED: RIGHT SUPERIOR CENTRAL MALAR CHEEK
LOCATION DETAILED: RIGHT INFERIOR LATERAL MIDBACK
LOCATION DETAILED: INFERIOR MID FOREHEAD
LOCATION DETAILED: LEFT SUPERIOR CENTRAL MALAR CHEEK
LOCATION DETAILED: LEFT PROXIMAL DORSAL FOREARM
LOCATION DETAILED: LEFT LATERAL PROXIMAL PRETIBIAL REGION
LOCATION DETAILED: LEFT DISTAL CALF
LOCATION DETAILED: RIGHT PROXIMAL DORSAL FOREARM
LOCATION DETAILED: LEFT INFERIOR LATERAL MIDBACK
LOCATION DETAILED: RIGHT SUPERIOR FOREHEAD
LOCATION DETAILED: RIGHT DISTAL PRETIBIAL REGION
LOCATION DETAILED: RIGHT DISTAL CALF
LOCATION DETAILED: LEFT MEDIAL INFERIOR CHEST
LOCATION DETAILED: RIGHT ULNAR DORSAL HAND
LOCATION DETAILED: INFERIOR THORACIC SPINE
LOCATION DETAILED: SUPERIOR THORACIC SPINE

## 2023-09-21 ASSESSMENT — LOCATION SIMPLE DESCRIPTION DERM
LOCATION SIMPLE: LEFT UPPER BACK
LOCATION SIMPLE: RIGHT PRETIBIAL REGION
LOCATION SIMPLE: RIGHT ANTERIOR NECK
LOCATION SIMPLE: RIGHT CHEEK
LOCATION SIMPLE: LEFT CHEEK
LOCATION SIMPLE: INFERIOR FOREHEAD
LOCATION SIMPLE: RIGHT LOWER BACK
LOCATION SIMPLE: CHEST
LOCATION SIMPLE: UPPER BACK
LOCATION SIMPLE: RIGHT FOREARM
LOCATION SIMPLE: RIGHT CALF
LOCATION SIMPLE: RIGHT HAND
LOCATION SIMPLE: LEFT LOWER BACK
LOCATION SIMPLE: RIGHT FOREHEAD
LOCATION SIMPLE: LEFT PRETIBIAL REGION
LOCATION SIMPLE: LEFT CALF
LOCATION SIMPLE: RIGHT ZYGOMA
LOCATION SIMPLE: LEFT FOREARM

## 2023-09-21 ASSESSMENT — LOCATION ZONE DERM
LOCATION ZONE: HAND
LOCATION ZONE: FACE
LOCATION ZONE: ARM
LOCATION ZONE: LEG
LOCATION ZONE: TRUNK
LOCATION ZONE: NECK

## 2023-09-21 NOTE — PROCEDURE: LIQUID NITROGEN
Number Of Freeze-Thaw Cycles: 2 freeze-thaw cycles
Post-Care Instructions: I reviewed with the patient in detail post-care instructions. Patient is to wear sunprotection, and avoid picking at any of the treated lesions. Pt may apply Vaseline to crusted or scabbing areas.
Render Post-Care Instructions In Note?: no
Duration Of Freeze Thaw-Cycle (Seconds): 0
Consent: The patient's consent was obtained including but not limited to risks of crusting, scabbing, blistering, scarring, darker or lighter pigmentary change, recurrence, incomplete removal and infection.
Detail Level: Detailed
Show Applicator Variable?: Yes

## 2023-10-23 RX ORDER — LEVOTHYROXINE SODIUM 0.03 MG/1
25 TABLET ORAL
Qty: 90 TABLET | Refills: 3 | Status: SHIPPED | OUTPATIENT
Start: 2023-10-23

## 2023-10-23 NOTE — TELEPHONE ENCOUNTER
Received request via: Pharmacy  9/11/23lov  Was the patient seen in the last year in this department? Yes    Does the patient have an active prescription (recently filled or refills available) for medication(s) requested? No    Does the patient have [facility] and need 100 day supply (blood pressure, diabetes and cholesterol meds only)? Patient does not have SCP

## 2024-02-02 DIAGNOSIS — E03.8 OTHER SPECIFIED HYPOTHYROIDISM: ICD-10-CM

## 2024-02-02 DIAGNOSIS — E78.00 ELEVATED LDL CHOLESTEROL LEVEL: ICD-10-CM

## 2024-02-02 DIAGNOSIS — I10 ESSENTIAL HYPERTENSION: ICD-10-CM

## 2024-02-02 DIAGNOSIS — E55.9 VITAMIN D DEFICIENCY: ICD-10-CM

## 2024-02-06 ENCOUNTER — HOSPITAL ENCOUNTER (OUTPATIENT)
Dept: LAB | Facility: MEDICAL CENTER | Age: 64
End: 2024-02-06
Attending: PHYSICIAN ASSISTANT
Payer: COMMERCIAL

## 2024-02-06 DIAGNOSIS — E03.8 OTHER SPECIFIED HYPOTHYROIDISM: ICD-10-CM

## 2024-02-06 DIAGNOSIS — I10 ESSENTIAL HYPERTENSION: ICD-10-CM

## 2024-02-06 DIAGNOSIS — E55.9 VITAMIN D DEFICIENCY: ICD-10-CM

## 2024-02-06 DIAGNOSIS — E78.00 ELEVATED LDL CHOLESTEROL LEVEL: ICD-10-CM

## 2024-02-06 LAB
ALBUMIN SERPL BCP-MCNC: 4.1 G/DL (ref 3.2–4.9)
ALBUMIN/GLOB SERPL: 1.3 G/DL
ALP SERPL-CCNC: 69 U/L (ref 30–99)
ALT SERPL-CCNC: 16 U/L (ref 2–50)
ANION GAP SERPL CALC-SCNC: 11 MMOL/L (ref 7–16)
AST SERPL-CCNC: 27 U/L (ref 12–45)
BASOPHILS # BLD AUTO: 0.8 % (ref 0–1.8)
BASOPHILS # BLD: 0.04 K/UL (ref 0–0.12)
BILIRUB SERPL-MCNC: 0.7 MG/DL (ref 0.1–1.5)
BUN SERPL-MCNC: 27 MG/DL (ref 8–22)
CALCIUM ALBUM COR SERPL-MCNC: 9 MG/DL (ref 8.5–10.5)
CALCIUM SERPL-MCNC: 9.1 MG/DL (ref 8.5–10.5)
CHLORIDE SERPL-SCNC: 106 MMOL/L (ref 96–112)
CHOLEST SERPL-MCNC: 183 MG/DL (ref 100–199)
CO2 SERPL-SCNC: 19 MMOL/L (ref 20–33)
CREAT SERPL-MCNC: 1.36 MG/DL (ref 0.5–1.4)
EOSINOPHIL # BLD AUTO: 0.07 K/UL (ref 0–0.51)
EOSINOPHIL NFR BLD: 1.4 % (ref 0–6.9)
ERYTHROCYTE [DISTWIDTH] IN BLOOD BY AUTOMATED COUNT: 43.8 FL (ref 35.9–50)
FASTING STATUS PATIENT QL REPORTED: NORMAL
GFR SERPLBLD CREATININE-BSD FMLA CKD-EPI: 59 ML/MIN/1.73 M 2
GLOBULIN SER CALC-MCNC: 3.2 G/DL (ref 1.9–3.5)
GLUCOSE SERPL-MCNC: 92 MG/DL (ref 65–99)
HCT VFR BLD AUTO: 43.5 % (ref 42–52)
HDLC SERPL-MCNC: 80 MG/DL
HGB BLD-MCNC: 14.9 G/DL (ref 14–18)
IMM GRANULOCYTES # BLD AUTO: 0.01 K/UL (ref 0–0.11)
IMM GRANULOCYTES NFR BLD AUTO: 0.2 % (ref 0–0.9)
LDLC SERPL CALC-MCNC: 95 MG/DL
LYMPHOCYTES # BLD AUTO: 1.31 K/UL (ref 1–4.8)
LYMPHOCYTES NFR BLD: 26.8 % (ref 22–41)
MCH RBC QN AUTO: 31.5 PG (ref 27–33)
MCHC RBC AUTO-ENTMCNC: 34.3 G/DL (ref 32.3–36.5)
MCV RBC AUTO: 92 FL (ref 81.4–97.8)
MONOCYTES # BLD AUTO: 0.46 K/UL (ref 0–0.85)
MONOCYTES NFR BLD AUTO: 9.4 % (ref 0–13.4)
NEUTROPHILS # BLD AUTO: 2.99 K/UL (ref 1.82–7.42)
NEUTROPHILS NFR BLD: 61.4 % (ref 44–72)
NRBC # BLD AUTO: 0 K/UL
NRBC BLD-RTO: 0 /100 WBC (ref 0–0.2)
PLATELET # BLD AUTO: 315 K/UL (ref 164–446)
PMV BLD AUTO: 9.3 FL (ref 9–12.9)
POTASSIUM SERPL-SCNC: 4.2 MMOL/L (ref 3.6–5.5)
PROT SERPL-MCNC: 7.3 G/DL (ref 6–8.2)
RBC # BLD AUTO: 4.73 M/UL (ref 4.7–6.1)
SODIUM SERPL-SCNC: 136 MMOL/L (ref 135–145)
TRIGL SERPL-MCNC: 39 MG/DL (ref 0–149)
WBC # BLD AUTO: 4.9 K/UL (ref 4.8–10.8)

## 2024-02-06 PROCEDURE — 80053 COMPREHEN METABOLIC PANEL: CPT

## 2024-02-06 PROCEDURE — 86376 MICROSOMAL ANTIBODY EACH: CPT

## 2024-02-06 PROCEDURE — 84443 ASSAY THYROID STIM HORMONE: CPT

## 2024-02-06 PROCEDURE — 86800 THYROGLOBULIN ANTIBODY: CPT

## 2024-02-06 PROCEDURE — 36415 COLL VENOUS BLD VENIPUNCTURE: CPT

## 2024-02-06 PROCEDURE — 85025 COMPLETE CBC W/AUTO DIFF WBC: CPT

## 2024-02-06 PROCEDURE — 82306 VITAMIN D 25 HYDROXY: CPT

## 2024-02-06 PROCEDURE — 80061 LIPID PANEL: CPT

## 2024-02-07 LAB
25(OH)D3 SERPL-MCNC: 56 NG/ML (ref 30–100)
THYROPEROXIDASE AB SERPL-ACNC: <9 IU/ML (ref 0–9)
TSH SERPL DL<=0.005 MIU/L-ACNC: 3.28 UIU/ML (ref 0.38–5.33)

## 2024-02-08 LAB
THYROGLOB AB SERPL-ACNC: <0.9 IU/ML (ref 0–4)
THYROPEROXIDASE AB SERPL-ACNC: 1.3 IU/ML (ref 0–9)

## 2024-02-26 SDOH — ECONOMIC STABILITY: INCOME INSECURITY: HOW HARD IS IT FOR YOU TO PAY FOR THE VERY BASICS LIKE FOOD, HOUSING, MEDICAL CARE, AND HEATING?: NOT HARD AT ALL

## 2024-02-26 SDOH — HEALTH STABILITY: PHYSICAL HEALTH: ON AVERAGE, HOW MANY DAYS PER WEEK DO YOU ENGAGE IN MODERATE TO STRENUOUS EXERCISE (LIKE A BRISK WALK)?: 5 DAYS

## 2024-02-26 SDOH — ECONOMIC STABILITY: FOOD INSECURITY: WITHIN THE PAST 12 MONTHS, THE FOOD YOU BOUGHT JUST DIDN'T LAST AND YOU DIDN'T HAVE MONEY TO GET MORE.: NEVER TRUE

## 2024-02-26 SDOH — HEALTH STABILITY: PHYSICAL HEALTH: ON AVERAGE, HOW MANY MINUTES DO YOU ENGAGE IN EXERCISE AT THIS LEVEL?: 120 MIN

## 2024-02-26 SDOH — ECONOMIC STABILITY: FOOD INSECURITY: WITHIN THE PAST 12 MONTHS, YOU WORRIED THAT YOUR FOOD WOULD RUN OUT BEFORE YOU GOT MONEY TO BUY MORE.: NEVER TRUE

## 2024-02-26 SDOH — ECONOMIC STABILITY: INCOME INSECURITY: IN THE LAST 12 MONTHS, WAS THERE A TIME WHEN YOU WERE NOT ABLE TO PAY THE MORTGAGE OR RENT ON TIME?: NO

## 2024-02-26 SDOH — ECONOMIC STABILITY: HOUSING INSECURITY: IN THE LAST 12 MONTHS, HOW MANY PLACES HAVE YOU LIVED?: 2

## 2024-02-26 ASSESSMENT — SOCIAL DETERMINANTS OF HEALTH (SDOH)
HOW OFTEN DO YOU ATTEND CHURCH OR RELIGIOUS SERVICES?: MORE THAN 4 TIMES PER YEAR
HOW OFTEN DO YOU GET TOGETHER WITH FRIENDS OR RELATIVES?: THREE TIMES A WEEK
HOW HARD IS IT FOR YOU TO PAY FOR THE VERY BASICS LIKE FOOD, HOUSING, MEDICAL CARE, AND HEATING?: NOT HARD AT ALL
HOW MANY DRINKS CONTAINING ALCOHOL DO YOU HAVE ON A TYPICAL DAY WHEN YOU ARE DRINKING: 1 OR 2
WITHIN THE PAST 12 MONTHS, YOU WORRIED THAT YOUR FOOD WOULD RUN OUT BEFORE YOU GOT THE MONEY TO BUY MORE: NEVER TRUE
HOW OFTEN DO YOU ATTENT MEETINGS OF THE CLUB OR ORGANIZATION YOU BELONG TO?: MORE THAN 4 TIMES PER YEAR
DO YOU BELONG TO ANY CLUBS OR ORGANIZATIONS SUCH AS CHURCH GROUPS UNIONS, FRATERNAL OR ATHLETIC GROUPS, OR SCHOOL GROUPS?: YES
IN A TYPICAL WEEK, HOW MANY TIMES DO YOU TALK ON THE PHONE WITH FAMILY, FRIENDS, OR NEIGHBORS?: MORE THAN THREE TIMES A WEEK
HOW OFTEN DO YOU GET TOGETHER WITH FRIENDS OR RELATIVES?: THREE TIMES A WEEK
DO YOU BELONG TO ANY CLUBS OR ORGANIZATIONS SUCH AS CHURCH GROUPS UNIONS, FRATERNAL OR ATHLETIC GROUPS, OR SCHOOL GROUPS?: YES
HOW OFTEN DO YOU ATTENT MEETINGS OF THE CLUB OR ORGANIZATION YOU BELONG TO?: MORE THAN 4 TIMES PER YEAR
IN A TYPICAL WEEK, HOW MANY TIMES DO YOU TALK ON THE PHONE WITH FAMILY, FRIENDS, OR NEIGHBORS?: MORE THAN THREE TIMES A WEEK
HOW OFTEN DO YOU HAVE A DRINK CONTAINING ALCOHOL: 2-3 TIMES A WEEK
HOW OFTEN DO YOU ATTEND CHURCH OR RELIGIOUS SERVICES?: MORE THAN 4 TIMES PER YEAR
HOW OFTEN DO YOU HAVE SIX OR MORE DRINKS ON ONE OCCASION: NEVER

## 2024-02-26 ASSESSMENT — LIFESTYLE VARIABLES
HOW MANY STANDARD DRINKS CONTAINING ALCOHOL DO YOU HAVE ON A TYPICAL DAY: 1 OR 2
HOW OFTEN DO YOU HAVE SIX OR MORE DRINKS ON ONE OCCASION: NEVER
HOW OFTEN DO YOU HAVE A DRINK CONTAINING ALCOHOL: 2-3 TIMES A WEEK
SKIP TO QUESTIONS 9-10: 1
AUDIT-C TOTAL SCORE: 3

## 2024-02-27 ENCOUNTER — OFFICE VISIT (OUTPATIENT)
Dept: MEDICAL GROUP | Facility: LAB | Age: 64
End: 2024-02-27
Payer: COMMERCIAL

## 2024-02-27 VITALS
DIASTOLIC BLOOD PRESSURE: 78 MMHG | SYSTOLIC BLOOD PRESSURE: 124 MMHG | RESPIRATION RATE: 12 BRPM | WEIGHT: 200 LBS | HEART RATE: 60 BPM | HEIGHT: 75 IN | BODY MASS INDEX: 24.87 KG/M2 | TEMPERATURE: 97.5 F | OXYGEN SATURATION: 100 %

## 2024-02-27 DIAGNOSIS — M17.12 PRIMARY OSTEOARTHRITIS OF LEFT KNEE: ICD-10-CM

## 2024-02-27 DIAGNOSIS — Z00.00 WELLNESS EXAMINATION: ICD-10-CM

## 2024-02-27 PROBLEM — K05.30 PERIODONTITIS: Chronic | Status: ACTIVE | Noted: 2024-01-31

## 2024-02-27 PROCEDURE — 99396 PREV VISIT EST AGE 40-64: CPT | Performed by: FAMILY MEDICINE

## 2024-02-27 PROCEDURE — 3074F SYST BP LT 130 MM HG: CPT | Performed by: FAMILY MEDICINE

## 2024-02-27 PROCEDURE — 3078F DIAST BP <80 MM HG: CPT | Performed by: FAMILY MEDICINE

## 2024-02-27 RX ORDER — LOSARTAN POTASSIUM 100 MG/1
100 TABLET ORAL
COMMUNITY
Start: 2024-02-14

## 2024-02-27 ASSESSMENT — PATIENT HEALTH QUESTIONNAIRE - PHQ9: CLINICAL INTERPRETATION OF PHQ2 SCORE: 0

## 2024-02-27 ASSESSMENT — FIBROSIS 4 INDEX: FIB4 SCORE: 1.33

## 2024-02-27 NOTE — PROGRESS NOTES
"Subjective:     Chief Complaint   Patient presents with    Annual Exam         HPI:   Thomas presents today for annual exam.     Diet: Healthy , no restrictions.   Exercise: some knee concerns. Left worse than right. Postsurgical, meniscal debridement. Wears a brace with skiing, swells up a lot.   Prior plantar fasciitis on right side. Then twisted right knee now as well. Still skiing, still playing pickle ball. Some restrictions with full flexion.   Sleep: doing better. In the past situational anxieties.   Colonoscopy: 2/2022, tubular adenoma, [facility], due 2025  Gu symptoms: up once few times per week. Not nightly. Cuts liquids after 8.             Current Outpatient Medications Ordered in Epic   Medication Sig Dispense Refill    levothyroxine (SYNTHROID) 25 MCG Tab Take 1 Tablet by mouth every morning on an empty stomach. 90 Tablet 3    tadalafil (CIALIS) 10 MG tablet Take 1 Tablet by mouth as needed for Erectile Dysfunction. 10 Tablet 11    VITAMIN D, CHOLECALCIFEROL, PO       Saw [place] 1000 MG Cap Take  by mouth.      aspirin 81 MG tablet Take 81 mg by mouth every day.       No current [facility]-ordered facility-administered medications on file.         ROS:  Gen: no fevers/chills, no changes in weight  Eyes: no changes in vision  ENT: no sore throat, no hearing loss, no bloody nose  Pulm: no sob, no cough  CV: no chest pain, no palpitations  GI: no nausea/vomiting, no diarrhea  : no dysuria  Skin: no rash  Neuro: no headaches, no numbness/tingling  Heme/Lymph: no easy bruising      Objective:     Exam:  /78 (BP Location: Right arm, Patient Position: Sitting, BP Cuff Size: Adult)   Pulse 60   Temp 36.4 °C (97.5 °F)   Resp 12   Ht 1.905 m (6' 3\")   Wt 90.7 kg (200 lb)   SpO2 100%   BMI 25.00 kg/m²  Body mass index is 25 kg/m².    Gen: Alert and oriented, No apparent distress.  Neck: Neck is supple without lymphadenopathy.  Lungs: Normal effort, CTA bilaterally, no wheezes, rhonchi, or rales  CV: Regular " rate and rhythm. No murmurs, rubs, or gallops.  Ext: No clubbing, cyanosis, edema.      Assessment & Plan:     63 y.o. male with the following -     1. Wellness examination  Discussed diet excise recommendations.  Reviewed recent lab work.  He did have a little bit of a lower GFR but a little bit dry or dehydrated with an elevated BUN as well.  No concerns with his right now.  We discussed continuing his medications as is.  Discussed age-related anticipatory guidance.  He is very caught up on regular routine things.    2. Primary osteoarthritis of left knee  We did discuss referral to orthopedics to discuss possible replacement of the left knee which he reports is been bone-on-bone for quite some time.  He has been a little bit more restricted with this over time and having a lot of repeated swelling.  Also has now hurt his right knee possibly while skiing.  - Referral to Orthopedics            No follow-ups on file.    Please note that this dictation was created using voice recognition software. I have made every reasonable attempt to correct obvious errors, but I expect that there are errors of grammar and possibly content that I did not discover before finalizing the note.

## 2024-03-25 ENCOUNTER — APPOINTMENT (RX ONLY)
Dept: URBAN - METROPOLITAN AREA CLINIC 22 | Facility: CLINIC | Age: 64
Setting detail: DERMATOLOGY
End: 2024-03-25

## 2024-03-25 DIAGNOSIS — D18.0 HEMANGIOMA: ICD-10-CM

## 2024-03-25 DIAGNOSIS — L57.0 ACTINIC KERATOSIS: ICD-10-CM

## 2024-03-25 DIAGNOSIS — L81.4 OTHER MELANIN HYPERPIGMENTATION: ICD-10-CM

## 2024-03-25 DIAGNOSIS — L73.8 OTHER SPECIFIED FOLLICULAR DISORDERS: ICD-10-CM

## 2024-03-25 DIAGNOSIS — Z85.828 PERSONAL HISTORY OF OTHER MALIGNANT NEOPLASM OF SKIN: ICD-10-CM

## 2024-03-25 DIAGNOSIS — D22 MELANOCYTIC NEVI: ICD-10-CM

## 2024-03-25 DIAGNOSIS — L91.8 OTHER HYPERTROPHIC DISORDERS OF THE SKIN: ICD-10-CM

## 2024-03-25 DIAGNOSIS — L82.1 OTHER SEBORRHEIC KERATOSIS: ICD-10-CM

## 2024-03-25 PROBLEM — D22.5 MELANOCYTIC NEVI OF TRUNK: Status: ACTIVE | Noted: 2024-03-25

## 2024-03-25 PROBLEM — D18.01 HEMANGIOMA OF SKIN AND SUBCUTANEOUS TISSUE: Status: ACTIVE | Noted: 2024-03-25

## 2024-03-25 PROCEDURE — 11200 RMVL SKIN TAGS UP TO&INC 15: CPT

## 2024-03-25 PROCEDURE — 17000 DESTRUCT PREMALG LESION: CPT | Mod: 59

## 2024-03-25 PROCEDURE — ? LIQUID NITROGEN

## 2024-03-25 PROCEDURE — ? ADDITIONAL NOTES

## 2024-03-25 PROCEDURE — ? SKIN TAG REMOVAL

## 2024-03-25 PROCEDURE — ? COUNSELING

## 2024-03-25 PROCEDURE — 99213 OFFICE O/P EST LOW 20 MIN: CPT | Mod: 25

## 2024-03-25 ASSESSMENT — LOCATION DETAILED DESCRIPTION DERM
LOCATION DETAILED: LEFT VENTRAL PROXIMAL FOREARM
LOCATION DETAILED: RIGHT DISTAL CALF
LOCATION DETAILED: EPIGASTRIC SKIN
LOCATION DETAILED: LEFT SUPERIOR CENTRAL MALAR CHEEK
LOCATION DETAILED: LEFT PROXIMAL PRETIBIAL REGION
LOCATION DETAILED: LEFT MID-UPPER BACK
LOCATION DETAILED: SUPERIOR THORACIC SPINE
LOCATION DETAILED: RIGHT INFERIOR FOREHEAD
LOCATION DETAILED: RIGHT LATERAL INFERIOR EYELID
LOCATION DETAILED: LEFT DISTAL CALF
LOCATION DETAILED: INFERIOR THORACIC SPINE
LOCATION DETAILED: LEFT MEDIAL INFERIOR CHEST
LOCATION DETAILED: LEFT LATERAL PROXIMAL PRETIBIAL REGION
LOCATION DETAILED: INFERIOR MID FOREHEAD
LOCATION DETAILED: RIGHT INFERIOR LATERAL NECK
LOCATION DETAILED: RIGHT VENTRAL PROXIMAL FOREARM
LOCATION DETAILED: RIGHT ULNAR DORSAL HAND
LOCATION DETAILED: LEFT PROXIMAL DORSAL FOREARM
LOCATION DETAILED: RIGHT PROXIMAL DORSAL FOREARM
LOCATION DETAILED: RIGHT DISTAL PRETIBIAL REGION
LOCATION DETAILED: RIGHT SUPERIOR FOREHEAD
LOCATION DETAILED: RIGHT SUPERIOR CENTRAL MALAR CHEEK

## 2024-03-25 ASSESSMENT — LOCATION SIMPLE DESCRIPTION DERM
LOCATION SIMPLE: LEFT UPPER BACK
LOCATION SIMPLE: RIGHT ANTERIOR NECK
LOCATION SIMPLE: CHEST
LOCATION SIMPLE: LEFT PRETIBIAL REGION
LOCATION SIMPLE: LEFT CHEEK
LOCATION SIMPLE: LEFT FOREARM
LOCATION SIMPLE: UPPER BACK
LOCATION SIMPLE: RIGHT PRETIBIAL REGION
LOCATION SIMPLE: RIGHT CHEEK
LOCATION SIMPLE: RIGHT FOREARM
LOCATION SIMPLE: ABDOMEN
LOCATION SIMPLE: RIGHT INFERIOR EYELID
LOCATION SIMPLE: RIGHT CALF
LOCATION SIMPLE: LEFT CALF
LOCATION SIMPLE: RIGHT FOREHEAD
LOCATION SIMPLE: RIGHT HAND
LOCATION SIMPLE: INFERIOR FOREHEAD

## 2024-03-25 ASSESSMENT — LOCATION ZONE DERM
LOCATION ZONE: TRUNK
LOCATION ZONE: ARM
LOCATION ZONE: EYELID
LOCATION ZONE: HAND
LOCATION ZONE: NECK
LOCATION ZONE: FACE
LOCATION ZONE: LEG

## 2024-03-25 NOTE — PROCEDURE: ADDITIONAL NOTES
Detail Level: Simple
Additional Notes: No numbing per pt request
Render Risk Assessment In Note?: no

## 2024-03-25 NOTE — PROCEDURE: LIQUID NITROGEN
Detail Level: Detailed
Number Of Freeze-Thaw Cycles: 2 freeze-thaw cycles
Render Post-Care Instructions In Note?: no
Consent: The patient's consent was obtained including but not limited to risks of crusting, scabbing, blistering, scarring, darker or lighter pigmentary change, recurrence, incomplete removal and infection.
Show Aperture Variable?: Yes
Application Tool (Optional): Cry-AC
Post-Care Instructions: I reviewed with the patient in detail post-care instructions. Patient is to wear sunprotection, and avoid picking at any of the treated lesions. Pt may apply Vaseline to crusted or scabbing areas.
Duration Of Freeze Thaw-Cycle (Seconds): 0

## 2024-03-25 NOTE — HPI: OTHER
Condition:: Skin tag
Please Describe Your Condition:: Right lower eye lid skin tag. The skin tag is itchy and irritated with rubbing the eyes. It has not been treated in the past.

## 2024-03-25 NOTE — PROCEDURE: SKIN TAG REMOVAL
Medical Necessity Clause: This procedure was medically necessary because the lesions that were treated were:
Medical Necessity Information: It is in your best interest to select a reason for this procedure from the list below. All of these items fulfill various CMS LCD requirements except the new and changing color options.
Detail Level: Detailed
Consent: Written consent obtained and the risks of skin tag removal was reviewed with the patient including but not limited to bleeding, pigmentary change, infection, pain, and remote possibility of scarring.
Add Associated Diagnoses If Applicable When Selecting Medical Necessity: Yes
Include Z78.9 (Other Specified Conditions Influencing Health Status) As An Associated Diagnosis?: No
Anesthesia Volume In Cc: 3

## 2024-04-09 ENCOUNTER — PATIENT MESSAGE (OUTPATIENT)
Dept: MEDICAL GROUP | Facility: LAB | Age: 64
End: 2024-04-09
Payer: COMMERCIAL

## 2024-04-09 DIAGNOSIS — S89.91XA INJURY OF RIGHT KNEE, INITIAL ENCOUNTER: ICD-10-CM

## 2024-04-09 DIAGNOSIS — M17.12 PRIMARY OSTEOARTHRITIS OF LEFT KNEE: ICD-10-CM

## 2024-05-19 ENCOUNTER — HOSPITAL ENCOUNTER (OUTPATIENT)
Dept: RADIOLOGY | Facility: MEDICAL CENTER | Age: 64
End: 2024-05-19
Attending: ORTHOPAEDIC SURGERY
Payer: COMMERCIAL

## 2024-05-19 ENCOUNTER — HOSPITAL ENCOUNTER (EMERGENCY)
Facility: MEDICAL CENTER | Age: 64
End: 2024-05-19
Payer: COMMERCIAL

## 2024-05-19 DIAGNOSIS — M17.12 PRIMARY OSTEOARTHRITIS OF LEFT KNEE: ICD-10-CM

## 2024-05-19 DIAGNOSIS — M25.562 CHRONIC PAIN OF LEFT KNEE: ICD-10-CM

## 2024-05-19 DIAGNOSIS — M25.361 KNEE INSTABILITY, RIGHT: ICD-10-CM

## 2024-05-19 DIAGNOSIS — M25.461 EFFUSION OF RIGHT KNEE: ICD-10-CM

## 2024-05-19 DIAGNOSIS — M25.561 ACUTE PAIN OF RIGHT KNEE: ICD-10-CM

## 2024-05-19 DIAGNOSIS — G89.29 CHRONIC PAIN OF LEFT KNEE: ICD-10-CM

## 2024-05-20 ENCOUNTER — PATIENT MESSAGE (OUTPATIENT)
Dept: MEDICAL GROUP | Facility: LAB | Age: 64
End: 2024-05-20
Payer: COMMERCIAL

## 2024-05-20 DIAGNOSIS — N52.8 OTHER MALE ERECTILE DYSFUNCTION: ICD-10-CM

## 2024-05-21 RX ORDER — TADALAFIL 10 MG/1
10 TABLET ORAL
Qty: 30 TABLET | Refills: 1 | Status: SHIPPED
Start: 2024-05-21 | End: 2024-06-20

## 2024-05-23 PROBLEM — S83.231A COMPLEX TEAR OF MEDIAL MENISCUS OF RIGHT KNEE AS CURRENT INJURY: Status: ACTIVE | Noted: 2024-05-23

## 2024-05-23 PROBLEM — S83.289A LATERAL MENISCUS TEAR: Status: ACTIVE | Noted: 2024-05-23

## 2024-06-11 ENCOUNTER — PATIENT MESSAGE (OUTPATIENT)
Dept: MEDICAL GROUP | Facility: LAB | Age: 64
End: 2024-06-11
Payer: COMMERCIAL

## 2024-06-11 RX ORDER — CEFDINIR 300 MG/1
300 CAPSULE ORAL 2 TIMES DAILY
Qty: 14 CAPSULE | Refills: 0 | Status: SHIPPED | OUTPATIENT
Start: 2024-06-11 | End: 2024-06-18

## 2024-06-14 PROBLEM — G45.9 TIA (TRANSIENT ISCHEMIC ATTACK): Status: ACTIVE | Noted: 2024-06-14

## 2024-06-27 PROBLEM — M17.12 DEGENERATIVE ARTHRITIS OF LEFT KNEE: Status: ACTIVE | Noted: 2024-06-27

## 2024-08-23 ENCOUNTER — HOSPITAL ENCOUNTER (OUTPATIENT)
Dept: RADIOLOGY | Facility: MEDICAL CENTER | Age: 64
End: 2024-08-23
Attending: ORTHOPAEDIC SURGERY
Payer: COMMERCIAL

## 2024-08-23 DIAGNOSIS — M17.12 PRIMARY OSTEOARTHRITIS OF LEFT KNEE: ICD-10-CM

## 2024-08-23 PROCEDURE — 73700 CT LOWER EXTREMITY W/O DYE: CPT | Mod: LT

## 2024-08-29 ENCOUNTER — APPOINTMENT (OUTPATIENT)
Dept: RADIOLOGY | Facility: MEDICAL CENTER | Age: 64
End: 2024-08-29
Attending: ORTHOPAEDIC SURGERY
Payer: COMMERCIAL

## 2024-10-21 RX ORDER — CHLORPROMAZINE HYDROCHLORIDE 25 MG/1
25 TABLET, FILM COATED ORAL EVERY 8 HOURS PRN
Qty: 9 TABLET | Refills: 0 | Status: SHIPPED | OUTPATIENT
Start: 2024-10-21

## 2024-11-01 ENCOUNTER — PATIENT MESSAGE (OUTPATIENT)
Dept: MEDICAL GROUP | Facility: LAB | Age: 64
End: 2024-11-01
Payer: COMMERCIAL

## 2024-11-01 DIAGNOSIS — G47.9 SLEEP DISTURBANCE: ICD-10-CM

## 2024-11-01 RX ORDER — CLONAZEPAM 0.5 MG/1
0.5 TABLET ORAL 2 TIMES DAILY
Qty: 60 TABLET | Refills: 0 | Status: SHIPPED | OUTPATIENT
Start: 2024-11-01 | End: 2024-12-01

## 2024-11-01 RX ORDER — LOSARTAN POTASSIUM 100 MG/1
100 TABLET ORAL
Qty: 90 TABLET | Refills: 3 | Status: SHIPPED | OUTPATIENT
Start: 2024-11-01

## 2024-11-01 RX ORDER — LEVOTHYROXINE SODIUM 25 UG/1
25 TABLET ORAL
Qty: 90 TABLET | Refills: 3 | Status: SHIPPED | OUTPATIENT
Start: 2024-11-01

## 2024-11-14 ENCOUNTER — PATIENT MESSAGE (OUTPATIENT)
Dept: MEDICAL GROUP | Facility: LAB | Age: 64
End: 2024-11-14
Payer: COMMERCIAL

## 2024-12-16 ENCOUNTER — TELEPHONE (OUTPATIENT)
Dept: MEDICAL GROUP | Facility: LAB | Age: 64
End: 2024-12-16
Payer: COMMERCIAL

## 2024-12-16 ENCOUNTER — APPOINTMENT (OUTPATIENT)
Dept: URBAN - METROPOLITAN AREA CLINIC 35 | Facility: CLINIC | Age: 64
Setting detail: DERMATOLOGY
End: 2024-12-16

## 2024-12-16 DIAGNOSIS — L81.4 OTHER MELANIN HYPERPIGMENTATION: ICD-10-CM

## 2024-12-16 DIAGNOSIS — D18.0 HEMANGIOMA: ICD-10-CM

## 2024-12-16 DIAGNOSIS — D22 MELANOCYTIC NEVI: ICD-10-CM

## 2024-12-16 DIAGNOSIS — L57.0 ACTINIC KERATOSIS: ICD-10-CM

## 2024-12-16 DIAGNOSIS — L11.1 TRANSIENT ACANTHOLYTIC DERMATOSIS [GROVER]: ICD-10-CM

## 2024-12-16 DIAGNOSIS — Z12.83 SKIN CANCER SCREENING: ICD-10-CM

## 2024-12-16 DIAGNOSIS — Z85.828 PERSONAL HISTORY OF OTHER MALIGNANT NEOPLASM OF SKIN: ICD-10-CM

## 2024-12-16 DIAGNOSIS — L82.1 OTHER SEBORRHEIC KERATOSIS: ICD-10-CM

## 2024-12-16 PROBLEM — D18.01 HEMANGIOMA OF SKIN AND SUBCUTANEOUS TISSUE: Status: ACTIVE | Noted: 2024-12-16

## 2024-12-16 PROBLEM — D22.5 MELANOCYTIC NEVI OF TRUNK: Status: ACTIVE | Noted: 2024-12-16

## 2024-12-16 PROCEDURE — ? COUNSELING

## 2024-12-16 PROCEDURE — 99213 OFFICE O/P EST LOW 20 MIN: CPT

## 2024-12-16 PROCEDURE — ? DEFER

## 2024-12-16 ASSESSMENT — LOCATION DETAILED DESCRIPTION DERM
LOCATION DETAILED: LEFT MID-UPPER BACK
LOCATION DETAILED: SUPERIOR LUMBAR SPINE
LOCATION DETAILED: LEFT MEDIAL INFERIOR CHEST
LOCATION DETAILED: RIGHT SUPERIOR FOREHEAD
LOCATION DETAILED: LEFT LATERAL ABDOMEN
LOCATION DETAILED: LEFT INFERIOR LATERAL FOREHEAD
LOCATION DETAILED: LEFT INFERIOR LATERAL FOREHEAD
LOCATION DETAILED: LEFT SUPERIOR UPPER BACK
LOCATION DETAILED: SUPERIOR THORACIC SPINE
LOCATION DETAILED: RIGHT DISTAL PRETIBIAL REGION

## 2024-12-16 ASSESSMENT — LOCATION ZONE DERM
LOCATION ZONE: TRUNK
LOCATION ZONE: LEG
LOCATION ZONE: FACE
LOCATION ZONE: FACE

## 2024-12-16 ASSESSMENT — LOCATION SIMPLE DESCRIPTION DERM
LOCATION SIMPLE: LEFT UPPER BACK
LOCATION SIMPLE: ABDOMEN
LOCATION SIMPLE: RIGHT PRETIBIAL REGION
LOCATION SIMPLE: LEFT FOREHEAD
LOCATION SIMPLE: RIGHT FOREHEAD
LOCATION SIMPLE: LOWER BACK
LOCATION SIMPLE: UPPER BACK
LOCATION SIMPLE: CHEST
LOCATION SIMPLE: LEFT FOREHEAD

## 2024-12-16 NOTE — PROCEDURE: DEFER
Detail Level: Detailed
Size Of Lesion In Cm (Optional): 0
Introduction Text (Please End With A Colon): N/a

## 2024-12-16 NOTE — TELEPHONE ENCOUNTER
Caller Name: Messi   Call Back Number: [phone]    How would the patient prefer to be contacted with a response: MyChart message    Patient needs a new referral to skin cancer and dermatology

## 2024-12-29 ENCOUNTER — PATIENT MESSAGE (OUTPATIENT)
Dept: MEDICAL GROUP | Facility: LAB | Age: 64
End: 2024-12-29
Payer: COMMERCIAL

## 2024-12-30 ENCOUNTER — OFFICE VISIT (OUTPATIENT)
Dept: MEDICAL GROUP | Facility: LAB | Age: 64
End: 2024-12-30
Payer: COMMERCIAL

## 2024-12-30 VITALS
OXYGEN SATURATION: 99 % | TEMPERATURE: 97.7 F | HEART RATE: 76 BPM | SYSTOLIC BLOOD PRESSURE: 148 MMHG | WEIGHT: 195 LBS | BODY MASS INDEX: 24.25 KG/M2 | RESPIRATION RATE: 16 BRPM | HEIGHT: 75 IN | DIASTOLIC BLOOD PRESSURE: 92 MMHG

## 2024-12-30 DIAGNOSIS — R53.83 OTHER FATIGUE: ICD-10-CM

## 2024-12-30 DIAGNOSIS — I10 HYPERTENSION, UNSPECIFIED TYPE: ICD-10-CM

## 2024-12-30 ASSESSMENT — FIBROSIS 4 INDEX: FIB4 SCORE: 1.35

## 2024-12-30 NOTE — PROGRESS NOTES
Subjective:     Chief Complaint   Patient presents with    Lightheadedness    Weakness    Headache         HPI:   Thomas presents today for lightheadedness, weakness, headaches.   He associates this with elevated BP.     Lightheadedness seems worse with bending forward or doing more exertion. Feeling like some tingling and pressure type feeling in the head.   Started with some vertigo while in bible study after spending some time with head down and eyes closed. When he changed head position he got some dizziness and some nausea. Was a bit weak and stumbling with this as well.     Sleep has been fitful, busy with people in the house for holidays. Sometimes some SOB and feeling like he can't inflate his lungs enough.   This is at rest but sometimes with activities.     Just got released from knee surgery but not feeling like he has any energy for things.    Appetite has been good. Usually intermittent fasting.   Alcohol use is down.     No sinus surgery. Overall sinuses have been better than when he first moved here.   Wife worried about possible TIA. No history of speech difficulty, limb weakness, etc.     Current Outpatient Medications Ordered in Epic   Medication Sig Dispense Refill    levothyroxine (SYNTHROID) 25 MCG Tab Take 1 Tablet by mouth every morning on an empty stomach. 90 Tablet 3    losartan (COZAAR) 100 MG Tab Take 1 Tablet by mouth every day. 90 Tablet 3    aspirin 81 MG tablet Take 81 mg by mouth every day.      tadalafil (CIALIS) 10 MG tablet Take 1 Tablet by mouth 1 time a day as needed for Erectile Dysfunction. 10 Tablet 12    chlorproMAZINE (THORAZINE) 25 MG Tab Take 1 Tablet by mouth every 8 hours as needed (hiccups). 9 Tablet 0    docusate sodium (COLACE) 100 MG Cap Take 1 Capsule by mouth 3 times a day as needed for Constipation. 90 Capsule 0    ondansetron (ZOFRAN ODT) 4 MG TABLET DISPERSIBLE Take 1 Tablet by mouth every 6 hours as needed for Nausea/Vomiting. 20 Tablet 0     No current  "[facility]-ordered facility-administered medications on file.         ROS:  Gen: no fevers/chills, no changes in weight  Pulm: no sob, no cough  CV: no chest pain, no palpitations  : no dysuria  MSk: no myalgias  Skin: no rash  Heme/Lymph: no easy bruising      Objective:     Exam:  BP (!) 148/92   Pulse 76   Temp 36.5 °C (97.7 °F)   Resp 16   Ht 1.905 m (6' 3\")   Wt 88.5 kg (195 lb)   SpO2 99%   BMI 24.37 kg/m²  Body mass index is 24.37 kg/m².    Gen: Alert and oriented, No apparent distress.  Neck: Neck is supple without lymphadenopathy.  Lungs: Normal effort, CTA bilaterally, no wheezes, rhonchi, or rales  CV: Regular rate and rhythm. No murmurs, rubs, or gallops.  Ext: No clubbing, cyanosis, edema.      Assessment & Plan:     64 y.o. male with the following -     1. Hypertension, unspecified type  Discussed split dosing his losartan. Half in am and half in PM. He can take an extra half this evening. He will continue to track BP as well as symptoms, checking BP twice daily  - TSH WITH REFLEX TO FT4; Future  - Comp Metabolic Panel; Future  - TROPONIN; Future    2. Other fatigue  Will get updated labs and further workup. TIA would not be my first concern. He may have some sleep dysfunction, with wife reporting more snoring of late, increased napping in the daytime recently.   - TESTOSTERONE SERUM; Future  - CBC WITH DIFFERENTIAL; Future  - D-DIMER; Future            No follow-ups on file.    Please note that this dictation was created using voice recognition software. I have made every reasonable attempt to correct obvious errors, but I expect that there are errors of grammar and possibly content that I did not discover before finalizing the note.        "

## 2024-12-31 ENCOUNTER — HOSPITAL ENCOUNTER (OUTPATIENT)
Dept: RADIOLOGY | Facility: MEDICAL CENTER | Age: 64
End: 2024-12-31
Attending: FAMILY MEDICINE
Payer: COMMERCIAL

## 2024-12-31 ENCOUNTER — TELEPHONE (OUTPATIENT)
Dept: MEDICAL GROUP | Facility: LAB | Age: 64
End: 2024-12-31
Payer: COMMERCIAL

## 2024-12-31 ENCOUNTER — HOSPITAL ENCOUNTER (OUTPATIENT)
Dept: LAB | Facility: MEDICAL CENTER | Age: 64
End: 2024-12-31
Attending: FAMILY MEDICINE
Payer: COMMERCIAL

## 2024-12-31 DIAGNOSIS — R53.83 OTHER FATIGUE: ICD-10-CM

## 2024-12-31 DIAGNOSIS — I10 HYPERTENSION, UNSPECIFIED TYPE: ICD-10-CM

## 2024-12-31 DIAGNOSIS — R79.89 POSITIVE D DIMER: ICD-10-CM

## 2024-12-31 DIAGNOSIS — R79.89 LOW TSH LEVEL: ICD-10-CM

## 2024-12-31 LAB
ALBUMIN SERPL BCP-MCNC: 3.8 G/DL (ref 3.2–4.9)
ALBUMIN/GLOB SERPL: 1.2 G/DL
ALP SERPL-CCNC: 102 U/L (ref 30–99)
ALT SERPL-CCNC: 16 U/L (ref 2–50)
ANION GAP SERPL CALC-SCNC: 13 MMOL/L (ref 7–16)
AST SERPL-CCNC: 12 U/L (ref 12–45)
BASOPHILS # BLD AUTO: 0.5 % (ref 0–1.8)
BASOPHILS # BLD: 0.04 K/UL (ref 0–0.12)
BILIRUB SERPL-MCNC: 0.4 MG/DL (ref 0.1–1.5)
BUN SERPL-MCNC: 24 MG/DL (ref 8–22)
CALCIUM ALBUM COR SERPL-MCNC: 9.3 MG/DL (ref 8.5–10.5)
CALCIUM SERPL-MCNC: 9.1 MG/DL (ref 8.5–10.5)
CHLORIDE SERPL-SCNC: 108 MMOL/L (ref 96–112)
CO2 SERPL-SCNC: 18 MMOL/L (ref 20–33)
CREAT SERPL-MCNC: 1.26 MG/DL (ref 0.5–1.4)
D DIMER PPP IA.FEU-MCNC: 1.39 UG/ML (FEU) (ref 0–0.5)
EOSINOPHIL # BLD AUTO: 0.08 K/UL (ref 0–0.51)
EOSINOPHIL NFR BLD: 1 % (ref 0–6.9)
ERYTHROCYTE [DISTWIDTH] IN BLOOD BY AUTOMATED COUNT: 41.3 FL (ref 35.9–50)
GFR SERPLBLD CREATININE-BSD FMLA CKD-EPI: 64 ML/MIN/1.73 M 2
GLOBULIN SER CALC-MCNC: 3.2 G/DL (ref 1.9–3.5)
GLUCOSE SERPL-MCNC: 112 MG/DL (ref 65–99)
HCT VFR BLD AUTO: 38.9 % (ref 42–52)
HGB BLD-MCNC: 12.8 G/DL (ref 14–18)
IMM GRANULOCYTES # BLD AUTO: 0.02 K/UL (ref 0–0.11)
IMM GRANULOCYTES NFR BLD AUTO: 0.3 % (ref 0–0.9)
LYMPHOCYTES # BLD AUTO: 1.94 K/UL (ref 1–4.8)
LYMPHOCYTES NFR BLD: 25.3 % (ref 22–41)
MCH RBC QN AUTO: 29.7 PG (ref 27–33)
MCHC RBC AUTO-ENTMCNC: 32.9 G/DL (ref 32.3–36.5)
MCV RBC AUTO: 90.3 FL (ref 81.4–97.8)
MONOCYTES # BLD AUTO: 0.96 K/UL (ref 0–0.85)
MONOCYTES NFR BLD AUTO: 12.5 % (ref 0–13.4)
NEUTROPHILS # BLD AUTO: 4.62 K/UL (ref 1.82–7.42)
NEUTROPHILS NFR BLD: 60.4 % (ref 44–72)
NRBC # BLD AUTO: 0 K/UL
NRBC BLD-RTO: 0 /100 WBC (ref 0–0.2)
PLATELET # BLD AUTO: 429 K/UL (ref 164–446)
PMV BLD AUTO: 9.4 FL (ref 9–12.9)
POTASSIUM SERPL-SCNC: 4.6 MMOL/L (ref 3.6–5.5)
PROT SERPL-MCNC: 7 G/DL (ref 6–8.2)
RBC # BLD AUTO: 4.31 M/UL (ref 4.7–6.1)
SODIUM SERPL-SCNC: 139 MMOL/L (ref 135–145)
T4 FREE SERPL-MCNC: 3.13 NG/DL (ref 0.93–1.7)
TESTOST SERPL-MCNC: 812 NG/DL (ref 175–781)
TROPONIN T SERPL-MCNC: 9 NG/L (ref 6–19)
TSH SERPL DL<=0.005 MIU/L-ACNC: 0.02 UIU/ML (ref 0.38–5.33)
WBC # BLD AUTO: 7.7 K/UL (ref 4.8–10.8)

## 2024-12-31 PROCEDURE — 85379 FIBRIN DEGRADATION QUANT: CPT

## 2024-12-31 PROCEDURE — 84484 ASSAY OF TROPONIN QUANT: CPT

## 2024-12-31 PROCEDURE — 700117 HCHG RX CONTRAST REV CODE 255: Performed by: FAMILY MEDICINE

## 2024-12-31 PROCEDURE — 84439 ASSAY OF FREE THYROXINE: CPT

## 2024-12-31 PROCEDURE — 36415 COLL VENOUS BLD VENIPUNCTURE: CPT

## 2024-12-31 PROCEDURE — 85025 COMPLETE CBC W/AUTO DIFF WBC: CPT

## 2024-12-31 PROCEDURE — 71275 CT ANGIOGRAPHY CHEST: CPT

## 2024-12-31 PROCEDURE — 80053 COMPREHEN METABOLIC PANEL: CPT

## 2024-12-31 PROCEDURE — 84443 ASSAY THYROID STIM HORMONE: CPT

## 2024-12-31 PROCEDURE — 84403 ASSAY OF TOTAL TESTOSTERONE: CPT

## 2024-12-31 RX ADMIN — IOHEXOL 80 ML: 350 INJECTION, SOLUTION INTRAVENOUS at 18:45

## 2024-12-31 NOTE — TELEPHONE ENCOUNTER
Phoned and discussed labs with Messi.  I would like him to get a CTPA to rule out pulmonary embolism given recent knee replacement and elevated dimer.  Also discussed that his thyroid is overcorrected and this could be a reason for some of the symptoms that he has been experiencing.  Would like him to go ahead and cut down on his levothyroxine 2 days/week so that he is taking 25 mcg 5 days a week and then has 2 days off.  We can recheck this in February.  Lab orders are placed for this.  CTPA ordered as stat call so we can try to get this done today.

## 2025-04-01 ENCOUNTER — HOSPITAL ENCOUNTER (OUTPATIENT)
Dept: LAB | Facility: MEDICAL CENTER | Age: 65
End: 2025-04-01
Attending: FAMILY MEDICINE
Payer: COMMERCIAL

## 2025-04-01 DIAGNOSIS — R79.89 LOW TSH LEVEL: ICD-10-CM

## 2025-04-01 LAB
T4 FREE SERPL-MCNC: 1.42 NG/DL (ref 0.93–1.7)
TSH SERPL-ACNC: 4.59 UIU/ML (ref 0.35–5.5)

## 2025-04-01 PROCEDURE — 36415 COLL VENOUS BLD VENIPUNCTURE: CPT

## 2025-04-01 PROCEDURE — 84439 ASSAY OF FREE THYROXINE: CPT

## 2025-04-01 PROCEDURE — 84443 ASSAY THYROID STIM HORMONE: CPT

## 2025-04-03 ENCOUNTER — RESULTS FOLLOW-UP (OUTPATIENT)
Dept: MEDICAL GROUP | Facility: LAB | Age: 65
End: 2025-04-03
Payer: COMMERCIAL

## 2025-04-03 DIAGNOSIS — N52.8 OTHER MALE ERECTILE DYSFUNCTION: ICD-10-CM

## 2025-04-03 DIAGNOSIS — D50.8 OTHER IRON DEFICIENCY ANEMIA: ICD-10-CM

## 2025-04-03 RX ORDER — TADALAFIL 10 MG/1
10 TABLET ORAL
Qty: 10 TABLET | Refills: 12 | Status: SHIPPED
Start: 2025-04-03

## 2025-06-16 ENCOUNTER — APPOINTMENT (OUTPATIENT)
Dept: URBAN - METROPOLITAN AREA CLINIC 35 | Facility: CLINIC | Age: 65
Setting detail: DERMATOLOGY
End: 2025-06-16

## 2025-06-16 DIAGNOSIS — L57.0 ACTINIC KERATOSIS: ICD-10-CM

## 2025-06-16 DIAGNOSIS — L82.1 OTHER SEBORRHEIC KERATOSIS: ICD-10-CM

## 2025-06-16 DIAGNOSIS — D18.0 HEMANGIOMA: ICD-10-CM

## 2025-06-16 DIAGNOSIS — L73.9 FOLLICULAR DISORDER, UNSPECIFIED: ICD-10-CM

## 2025-06-16 DIAGNOSIS — D485 NEOPLASM OF UNCERTAIN BEHAVIOR OF SKIN: ICD-10-CM | Status: INADEQUATELY CONTROLLED

## 2025-06-16 DIAGNOSIS — D22 MELANOCYTIC NEVI: ICD-10-CM

## 2025-06-16 DIAGNOSIS — L73.8 OTHER SPECIFIED FOLLICULAR DISORDERS: ICD-10-CM

## 2025-06-16 DIAGNOSIS — Z85.828 PERSONAL HISTORY OF OTHER MALIGNANT NEOPLASM OF SKIN: ICD-10-CM

## 2025-06-16 DIAGNOSIS — L81.4 OTHER MELANIN HYPERPIGMENTATION: ICD-10-CM

## 2025-06-16 PROBLEM — D18.01 HEMANGIOMA OF SKIN AND SUBCUTANEOUS TISSUE: Status: ACTIVE | Noted: 2025-06-16

## 2025-06-16 PROBLEM — D48.5 NEOPLASM OF UNCERTAIN BEHAVIOR OF SKIN: Status: ACTIVE | Noted: 2025-06-16

## 2025-06-16 PROBLEM — D22.5 MELANOCYTIC NEVI OF TRUNK: Status: ACTIVE | Noted: 2025-06-16

## 2025-06-16 PROCEDURE — ? SURGICAL DECISION MAKING

## 2025-06-16 PROCEDURE — ? LIQUID NITROGEN

## 2025-06-16 PROCEDURE — ? BIOPSY BY SHAVE METHOD

## 2025-06-16 PROCEDURE — ? SEPARATE AND IDENTIFIABLE DOCUMENTATION

## 2025-06-16 PROCEDURE — ? COUNSELING

## 2025-06-16 ASSESSMENT — LOCATION ZONE DERM
LOCATION ZONE: ARM
LOCATION ZONE: LEG
LOCATION ZONE: FACE
LOCATION ZONE: TRUNK

## 2025-06-16 ASSESSMENT — LOCATION DETAILED DESCRIPTION DERM
LOCATION DETAILED: RIGHT VENTRAL PROXIMAL FOREARM
LOCATION DETAILED: RIGHT MEDIAL UPPER BACK
LOCATION DETAILED: LEFT MEDIAL INFERIOR CHEST
LOCATION DETAILED: RIGHT RIB CAGE
LOCATION DETAILED: RIGHT SUPERIOR UPPER BACK
LOCATION DETAILED: LEFT PROXIMAL PRETIBIAL REGION
LOCATION DETAILED: RIGHT SUPERIOR FOREHEAD
LOCATION DETAILED: LEFT BUTTOCK
LOCATION DETAILED: LEFT CENTRAL ZYGOMA
LOCATION DETAILED: RIGHT CENTRAL MALAR CHEEK
LOCATION DETAILED: RIGHT DISTAL PRETIBIAL REGION
LOCATION DETAILED: RIGHT BUTTOCK
LOCATION DETAILED: LEFT MID-UPPER BACK
LOCATION DETAILED: LEFT SUPERIOR UPPER BACK
LOCATION DETAILED: RIGHT PROXIMAL DORSAL FOREARM
LOCATION DETAILED: RIGHT CENTRAL ZYGOMA

## 2025-06-16 ASSESSMENT — LOCATION SIMPLE DESCRIPTION DERM
LOCATION SIMPLE: RIGHT FOREARM
LOCATION SIMPLE: LEFT PRETIBIAL REGION
LOCATION SIMPLE: CHEST
LOCATION SIMPLE: ABDOMEN
LOCATION SIMPLE: RIGHT FOREHEAD
LOCATION SIMPLE: LEFT ZYGOMA
LOCATION SIMPLE: LEFT BUTTOCK
LOCATION SIMPLE: RIGHT ZYGOMA
LOCATION SIMPLE: RIGHT BUTTOCK
LOCATION SIMPLE: LEFT UPPER BACK
LOCATION SIMPLE: RIGHT CHEEK
LOCATION SIMPLE: RIGHT UPPER BACK
LOCATION SIMPLE: RIGHT PRETIBIAL REGION

## 2025-06-16 NOTE — PROCEDURE: COUNSELING
Detail Level: Simple
Detail Level: Generalized
Detail Level: Zone
Nicotinamide Supplementation Recommendations: Heliocare advanced

## 2025-06-16 NOTE — PROCEDURE: BIOPSY BY SHAVE METHOD
Detail Level: Detailed
Depth Of Biopsy: dermis
Was A Bandage Applied: Yes
Size Of Lesion In Cm: 1
X Size Of Lesion In Cm: 0
Biopsy Type: H and E
Biopsy Method: Dermablade
Anesthesia Type: 1% lidocaine without epinephrine
Anesthesia Volume In Cc: 1.1
Hemostasis: Aluminum Chloride
Wound Care: Petrolatum
Dressing: Band-Aid
Destruction After The Procedure: No
Type Of Destruction Used: Curettage
Curettage Text: The wound bed was treated with curettage after the biopsy was performed.
Electrodesiccation And Curettage Text: The wound bed was treated with electrodesiccation and curettage after the biopsy was
Lab: [id number]
Medical Necessity Information: It is in your best interest to select a reason for this procedure from the list below. All of these items fulfill various CMS LCD requirements except the new and changing color options.
Consent: Verbal consent was obtained and risks were reviewed including but not limited to scarring, infection, bleeding, scabbing, incomplete removal, nerve damage and allergy to anesthesia.
Post-Care Instructions: I reviewed with the patient in detail post-care instructions. Patient is to keep the biopsy site dry overnight, and then apply white petrolatum twice daily until healed. Patient may apply hydrogen peroxide soaks to remove any crusting.
Notification Instructions: Patient will be notified of biopsy results. However, patient instructed to call the office if not contacted within 2 weeks.
Billing Type: Third-Party Bill
Information: Selecting Yes will display possible errors in your note based on the variables you have selected. This validation is only offered as a suggestion for you. PLEASE NOTE THAT THE VALIDATION TEXT WILL BE REMOVED WHEN YOU FINALIZE YOUR NOTE. IF YOU WANT TO FAX A PRELIMINARY NOTE YOU WILL NEED TO TOGGLE THIS TO 'NO' IF YOU DO NOT WANT IT IN YOUR FAXED NOTE.

## 2025-06-16 NOTE — PROCEDURE: SURGICAL DECISION MAKING
Risk Assessment Explanation (Does Not Render In The Note): Clinical determination of the probability and/or consequences of an event, such as surgery. Clinical assessment of the level of risk is affected by the nature of the event under consideration for the patient. Modifier 57 is used to indicate an Evaluation and Management (E/M) service resulted in the initial decision to perform surgery either the day before a major surgery (90 day global) or the day of a major surgery.
Date Of Surgery - Today Or Tomorrow?: today
Complexity (Necessary For Coding; Major - 90 Day Global With Some Exceptions; Minor - 10 Day Global): minor
Initial Decision For Surgery?: Yes
Discussion: We discussed not only the risks of the procedure but also the likely fraire that further treatment will be required to treat lesion. This could involve another visit here to destroy or excise  lesion, a visit to a Mohs or general or plastic surgeon, scarring, limited  activity, cosmetic imperfections.

## 2025-06-16 NOTE — PROCEDURE: LIQUID NITROGEN
Detail Level: Detailed
Post-Care Instructions: I reviewed with the patient in detail post-care instructions. Patient is to wear sunprotection, and avoid picking at any of the treated lesions. Pt may apply Vaseline to crusted or scabbing areas.
Consent: The patient's consent was obtained including but not limited to risks of crusting, scabbing, blistering, scarring, darker or lighter pigmentary change, recurrence, incomplete removal and infection.
Duration Of Freeze Thaw-Cycle (Seconds): 2
Show Aperture Variable?: Yes
Number Of Freeze-Thaw Cycles: 2 freeze-thaw cycles
Render Note In Bullet Format When Appropriate: No
Application Tool (Optional): Cry-AC

## 2025-06-16 NOTE — PROCEDURE: MIPS QUALITY
Quality 130: Documentation Of Current Medications In The Medical Record: Current Medications Documented
Quality 47: Advance Care Plan: Advance Care Planning discussed and documented; advance care plan or surrogate decision maker documented in the medical record.
Name And Contact Information For Health Care Proxy: Ktae Sanchez [phone]
Detail Level: Detailed
Quality 226: Preventive Care And Screening: Tobacco Use: Screening And Cessation Intervention: Patient screened for tobacco use and is an ex/non-smoker